# Patient Record
Sex: FEMALE | Race: WHITE | URBAN - METROPOLITAN AREA
[De-identification: names, ages, dates, MRNs, and addresses within clinical notes are randomized per-mention and may not be internally consistent; named-entity substitution may affect disease eponyms.]

---

## 2017-07-17 ENCOUNTER — APPOINTMENT (OUTPATIENT)
Dept: ULTRASOUND IMAGING | Facility: CLINIC | Age: 22
End: 2017-07-17
Attending: INTERNAL MEDICINE
Payer: COMMERCIAL

## 2017-07-17 ENCOUNTER — HOSPITAL ENCOUNTER (OUTPATIENT)
Facility: CLINIC | Age: 22
Setting detail: OBSERVATION
Discharge: HOME | End: 2017-07-18
Attending: EMERGENCY MEDICINE | Admitting: EMERGENCY MEDICINE
Payer: COMMERCIAL

## 2017-07-17 DIAGNOSIS — N10 ACUTE PYELONEPHRITIS: ICD-10-CM

## 2017-07-17 DIAGNOSIS — N12 PYELONEPHRITIS: Primary | ICD-10-CM

## 2017-07-17 DIAGNOSIS — R11.0 NAUSEA: ICD-10-CM

## 2017-07-17 LAB
ALBUMIN SERPL-MCNC: 3.9 G/DL (ref 3.4–5)
ALBUMIN UR-MCNC: 100 MG/DL
ALP SERPL-CCNC: 82 U/L (ref 40–150)
ALT SERPL W P-5'-P-CCNC: 34 U/L (ref 0–50)
ANION GAP SERPL CALCULATED.3IONS-SCNC: 9 MMOL/L (ref 3–14)
APPEARANCE UR: ABNORMAL
AST SERPL W P-5'-P-CCNC: 18 U/L (ref 0–45)
BASOPHILS # BLD AUTO: 0 10E9/L (ref 0–0.2)
BASOPHILS NFR BLD AUTO: 0.2 %
BILIRUB SERPL-MCNC: 0.8 MG/DL (ref 0.2–1.3)
BILIRUB UR QL STRIP: NEGATIVE
BUN SERPL-MCNC: 8 MG/DL (ref 7–30)
CALCIUM SERPL-MCNC: 9.3 MG/DL (ref 8.5–10.1)
CHLORIDE SERPL-SCNC: 104 MMOL/L (ref 94–109)
CO2 SERPL-SCNC: 27 MMOL/L (ref 20–32)
COLOR UR AUTO: YELLOW
CREAT SERPL-MCNC: 0.72 MG/DL (ref 0.52–1.04)
DIFFERENTIAL METHOD BLD: ABNORMAL
EOSINOPHIL # BLD AUTO: 0 10E9/L (ref 0–0.7)
EOSINOPHIL NFR BLD AUTO: 0.2 %
ERYTHROCYTE [DISTWIDTH] IN BLOOD BY AUTOMATED COUNT: 12.5 % (ref 10–15)
GFR SERPL CREATININE-BSD FRML MDRD: ABNORMAL ML/MIN/1.7M2
GLUCOSE SERPL-MCNC: 110 MG/DL (ref 70–99)
GLUCOSE UR STRIP-MCNC: NEGATIVE MG/DL
HCG UR QL: NEGATIVE
HCT VFR BLD AUTO: 42.7 % (ref 35–47)
HGB BLD-MCNC: 14.5 G/DL (ref 11.7–15.7)
HGB UR QL STRIP: ABNORMAL
IMM GRANULOCYTES # BLD: 0 10E9/L (ref 0–0.4)
IMM GRANULOCYTES NFR BLD: 0.2 %
INTERNAL QC OK POCT: YES
KETONES UR STRIP-MCNC: 10 MG/DL
LEUKOCYTE ESTERASE UR QL STRIP: ABNORMAL
LYMPHOCYTES # BLD AUTO: 1.1 10E9/L (ref 0.8–5.3)
LYMPHOCYTES NFR BLD AUTO: 8.9 %
MCH RBC QN AUTO: 30.3 PG (ref 26.5–33)
MCHC RBC AUTO-ENTMCNC: 34 G/DL (ref 31.5–36.5)
MCV RBC AUTO: 89 FL (ref 78–100)
MONOCYTES # BLD AUTO: 0.6 10E9/L (ref 0–1.3)
MONOCYTES NFR BLD AUTO: 5 %
MUCOUS THREADS #/AREA URNS LPF: PRESENT /LPF
NEUTROPHILS # BLD AUTO: 10.6 10E9/L (ref 1.6–8.3)
NEUTROPHILS NFR BLD AUTO: 85.5 %
NITRATE UR QL: NEGATIVE
NRBC # BLD AUTO: 0 10*3/UL
NRBC BLD AUTO-RTO: 0 /100
PH UR STRIP: 7 PH (ref 5–7)
PLATELET # BLD AUTO: 225 10E9/L (ref 150–450)
POTASSIUM SERPL-SCNC: 3.4 MMOL/L (ref 3.4–5.3)
PROT SERPL-MCNC: 8 G/DL (ref 6.8–8.8)
RBC # BLD AUTO: 4.78 10E12/L (ref 3.8–5.2)
RBC #/AREA URNS AUTO: 25 /HPF (ref 0–2)
SODIUM SERPL-SCNC: 139 MMOL/L (ref 133–144)
SP GR UR STRIP: 1.01 (ref 1–1.03)
SQUAMOUS #/AREA URNS AUTO: 4 /HPF (ref 0–1)
TRANS CELLS #/AREA URNS HPF: 3 /HPF (ref 0–1)
URN SPEC COLLECT METH UR: ABNORMAL
UROBILINOGEN UR STRIP-MCNC: NORMAL MG/DL (ref 0–2)
WBC # BLD AUTO: 12.4 10E9/L (ref 4–11)
WBC #/AREA URNS AUTO: >182 /HPF (ref 0–2)
WBC CLUMPS #/AREA URNS HPF: PRESENT /HPF

## 2017-07-17 PROCEDURE — 76770 US EXAM ABDO BACK WALL COMP: CPT

## 2017-07-17 PROCEDURE — 96361 HYDRATE IV INFUSION ADD-ON: CPT | Performed by: EMERGENCY MEDICINE

## 2017-07-17 PROCEDURE — 96375 TX/PRO/DX INJ NEW DRUG ADDON: CPT | Performed by: EMERGENCY MEDICINE

## 2017-07-17 PROCEDURE — 99285 EMERGENCY DEPT VISIT HI MDM: CPT | Mod: 25 | Performed by: EMERGENCY MEDICINE

## 2017-07-17 PROCEDURE — 25000128 H RX IP 250 OP 636: Performed by: NURSE PRACTITIONER

## 2017-07-17 PROCEDURE — 96376 TX/PRO/DX INJ SAME DRUG ADON: CPT

## 2017-07-17 PROCEDURE — 96365 THER/PROPH/DIAG IV INF INIT: CPT | Performed by: EMERGENCY MEDICINE

## 2017-07-17 PROCEDURE — 99207 ZZC APP CREDIT; MD BILLING SHARED VISIT: CPT | Mod: Z6 | Performed by: EMERGENCY MEDICINE

## 2017-07-17 PROCEDURE — 81001 URINALYSIS AUTO W/SCOPE: CPT | Performed by: EMERGENCY MEDICINE

## 2017-07-17 PROCEDURE — 76705 ECHO EXAM OF ABDOMEN: CPT

## 2017-07-17 PROCEDURE — 87086 URINE CULTURE/COLONY COUNT: CPT | Performed by: EMERGENCY MEDICINE

## 2017-07-17 PROCEDURE — G0378 HOSPITAL OBSERVATION PER HR: HCPCS

## 2017-07-17 PROCEDURE — 85025 COMPLETE CBC W/AUTO DIFF WBC: CPT | Performed by: EMERGENCY MEDICINE

## 2017-07-17 PROCEDURE — 81025 URINE PREGNANCY TEST: CPT | Performed by: EMERGENCY MEDICINE

## 2017-07-17 PROCEDURE — 99220 ZZC INITIAL OBSERVATION CARE,LEVL III: CPT | Mod: Z6 | Performed by: NURSE PRACTITIONER

## 2017-07-17 PROCEDURE — 96366 THER/PROPH/DIAG IV INF ADDON: CPT | Performed by: EMERGENCY MEDICINE

## 2017-07-17 PROCEDURE — 80053 COMPREHEN METABOLIC PANEL: CPT | Performed by: EMERGENCY MEDICINE

## 2017-07-17 PROCEDURE — 25000128 H RX IP 250 OP 636: Performed by: EMERGENCY MEDICINE

## 2017-07-17 PROCEDURE — 87088 URINE BACTERIA CULTURE: CPT | Performed by: EMERGENCY MEDICINE

## 2017-07-17 PROCEDURE — 87186 SC STD MICRODIL/AGAR DIL: CPT | Performed by: EMERGENCY MEDICINE

## 2017-07-17 RX ORDER — ONDANSETRON 2 MG/ML
4 INJECTION INTRAMUSCULAR; INTRAVENOUS EVERY 6 HOURS PRN
Status: DISCONTINUED | OUTPATIENT
Start: 2017-07-17 | End: 2017-07-17

## 2017-07-17 RX ORDER — NALOXONE HYDROCHLORIDE 0.4 MG/ML
.1-.4 INJECTION, SOLUTION INTRAMUSCULAR; INTRAVENOUS; SUBCUTANEOUS
Status: DISCONTINUED | OUTPATIENT
Start: 2017-07-17 | End: 2017-07-17

## 2017-07-17 RX ORDER — LEVOFLOXACIN 5 MG/ML
750 INJECTION, SOLUTION INTRAVENOUS EVERY 24 HOURS
Status: DISCONTINUED | OUTPATIENT
Start: 2017-07-18 | End: 2017-07-18

## 2017-07-17 RX ORDER — LIDOCAINE 40 MG/G
CREAM TOPICAL
Status: DISCONTINUED | OUTPATIENT
Start: 2017-07-17 | End: 2017-07-17

## 2017-07-17 RX ORDER — DESOGESTREL AND ETHINYL ESTRADIOL 0.15-0.03
1 KIT ORAL DAILY
Status: DISCONTINUED | OUTPATIENT
Start: 2017-07-18 | End: 2017-07-18 | Stop reason: HOSPADM

## 2017-07-17 RX ORDER — PROCHLORPERAZINE 25 MG
25 SUPPOSITORY, RECTAL RECTAL EVERY 12 HOURS PRN
Status: DISCONTINUED | OUTPATIENT
Start: 2017-07-17 | End: 2017-07-18 | Stop reason: HOSPADM

## 2017-07-17 RX ORDER — DESOGESTREL AND ETHINYL ESTRADIOL 0.15-0.03
1 KIT ORAL DAILY
COMMUNITY
End: 2018-10-25

## 2017-07-17 RX ORDER — PROCHLORPERAZINE MALEATE 5 MG
5-10 TABLET ORAL EVERY 6 HOURS PRN
Status: DISCONTINUED | OUTPATIENT
Start: 2017-07-17 | End: 2017-07-17

## 2017-07-17 RX ORDER — ONDANSETRON 4 MG/1
4 TABLET, ORALLY DISINTEGRATING ORAL EVERY 6 HOURS PRN
Status: DISCONTINUED | OUTPATIENT
Start: 2017-07-17 | End: 2017-07-18 | Stop reason: HOSPADM

## 2017-07-17 RX ORDER — PROCHLORPERAZINE MALEATE 5 MG
5-10 TABLET ORAL EVERY 6 HOURS PRN
Status: DISCONTINUED | OUTPATIENT
Start: 2017-07-17 | End: 2017-07-18 | Stop reason: HOSPADM

## 2017-07-17 RX ORDER — HYDROMORPHONE HYDROCHLORIDE 1 MG/ML
.3-.5 INJECTION, SOLUTION INTRAMUSCULAR; INTRAVENOUS; SUBCUTANEOUS EVERY 4 HOURS PRN
Status: DISCONTINUED | OUTPATIENT
Start: 2017-07-17 | End: 2017-07-18 | Stop reason: HOSPADM

## 2017-07-17 RX ORDER — PROCHLORPERAZINE 25 MG
25 SUPPOSITORY, RECTAL RECTAL EVERY 12 HOURS PRN
Status: DISCONTINUED | OUTPATIENT
Start: 2017-07-17 | End: 2017-07-17

## 2017-07-17 RX ORDER — NALOXONE HYDROCHLORIDE 0.4 MG/ML
.1-.4 INJECTION, SOLUTION INTRAMUSCULAR; INTRAVENOUS; SUBCUTANEOUS
Status: DISCONTINUED | OUTPATIENT
Start: 2017-07-17 | End: 2017-07-18 | Stop reason: HOSPADM

## 2017-07-17 RX ORDER — LEVOFLOXACIN 5 MG/ML
750 INJECTION, SOLUTION INTRAVENOUS ONCE
Status: COMPLETED | OUTPATIENT
Start: 2017-07-17 | End: 2017-07-17

## 2017-07-17 RX ORDER — MORPHINE SULFATE 4 MG/ML
4 INJECTION, SOLUTION INTRAMUSCULAR; INTRAVENOUS
Status: COMPLETED | OUTPATIENT
Start: 2017-07-17 | End: 2017-07-17

## 2017-07-17 RX ORDER — ONDANSETRON 2 MG/ML
4 INJECTION INTRAMUSCULAR; INTRAVENOUS ONCE
Status: COMPLETED | OUTPATIENT
Start: 2017-07-17 | End: 2017-07-17

## 2017-07-17 RX ORDER — KETOROLAC TROMETHAMINE 30 MG/ML
30 INJECTION, SOLUTION INTRAMUSCULAR; INTRAVENOUS ONCE
Status: COMPLETED | OUTPATIENT
Start: 2017-07-17 | End: 2017-07-17

## 2017-07-17 RX ORDER — SODIUM CHLORIDE 9 MG/ML
INJECTION, SOLUTION INTRAVENOUS CONTINUOUS
Status: DISCONTINUED | OUTPATIENT
Start: 2017-07-17 | End: 2017-07-18 | Stop reason: HOSPADM

## 2017-07-17 RX ORDER — ONDANSETRON 4 MG/1
4 TABLET, ORALLY DISINTEGRATING ORAL EVERY 6 HOURS PRN
Status: DISCONTINUED | OUTPATIENT
Start: 2017-07-17 | End: 2017-07-17

## 2017-07-17 RX ORDER — ONDANSETRON 2 MG/ML
4 INJECTION INTRAMUSCULAR; INTRAVENOUS EVERY 6 HOURS PRN
Status: DISCONTINUED | OUTPATIENT
Start: 2017-07-17 | End: 2017-07-18 | Stop reason: HOSPADM

## 2017-07-17 RX ORDER — KETOROLAC TROMETHAMINE 30 MG/ML
15-30 INJECTION, SOLUTION INTRAMUSCULAR; INTRAVENOUS EVERY 6 HOURS PRN
Status: DISCONTINUED | OUTPATIENT
Start: 2017-07-17 | End: 2017-07-18 | Stop reason: HOSPADM

## 2017-07-17 RX ORDER — ACETAMINOPHEN 325 MG/1
650 TABLET ORAL EVERY 4 HOURS PRN
Status: DISCONTINUED | OUTPATIENT
Start: 2017-07-17 | End: 2017-07-18 | Stop reason: HOSPADM

## 2017-07-17 RX ORDER — HYDROCODONE BITARTRATE AND ACETAMINOPHEN 5; 325 MG/1; MG/1
1-2 TABLET ORAL EVERY 4 HOURS PRN
Status: DISCONTINUED | OUTPATIENT
Start: 2017-07-17 | End: 2017-07-18 | Stop reason: HOSPADM

## 2017-07-17 RX ORDER — LIDOCAINE 40 MG/G
CREAM TOPICAL
Status: DISCONTINUED | OUTPATIENT
Start: 2017-07-17 | End: 2017-07-18 | Stop reason: HOSPADM

## 2017-07-17 RX ADMIN — MORPHINE SULFATE 4 MG: 4 INJECTION, SOLUTION INTRAMUSCULAR; INTRAVENOUS at 14:30

## 2017-07-17 RX ADMIN — SODIUM CHLORIDE: 9 INJECTION, SOLUTION INTRAVENOUS at 20:55

## 2017-07-17 RX ADMIN — LEVOFLOXACIN 750 MG: 5 INJECTION, SOLUTION INTRAVENOUS at 16:06

## 2017-07-17 RX ADMIN — KETOROLAC TROMETHAMINE 30 MG: 30 INJECTION, SOLUTION INTRAMUSCULAR at 20:25

## 2017-07-17 RX ADMIN — KETOROLAC TROMETHAMINE 30 MG: 30 INJECTION, SOLUTION INTRAMUSCULAR; INTRAVENOUS at 14:23

## 2017-07-17 RX ADMIN — SODIUM CHLORIDE 1000 ML: 9 INJECTION, SOLUTION INTRAVENOUS at 14:23

## 2017-07-17 RX ADMIN — ONDANSETRON 4 MG: 2 INJECTION INTRAMUSCULAR; INTRAVENOUS at 14:23

## 2017-07-17 ASSESSMENT — ACTIVITIES OF DAILY LIVING (ADL)
RETIRED_COMMUNICATION: 0-->UNDERSTANDS/COMMUNICATES WITHOUT DIFFICULTY
FALL_HISTORY_WITHIN_LAST_SIX_MONTHS: NO
DRESS: 0-->INDEPENDENT
RETIRED_EATING: 0-->INDEPENDENT
COGNITION: 0 - NO COGNITION ISSUES REPORTED
AMBULATION: 0-->INDEPENDENT
BATHING: 0-->INDEPENDENT
SWALLOWING: 0-->SWALLOWS FOODS/LIQUIDS WITHOUT DIFFICULTY
TOILETING: 0-->INDEPENDENT
TRANSFERRING: 0-->INDEPENDENT

## 2017-07-17 ASSESSMENT — ENCOUNTER SYMPTOMS
CONSTIPATION: 0
ABDOMINAL PAIN: 1
HEMATURIA: 0
NAUSEA: 1
APPETITE CHANGE: 0
VOMITING: 1
FEVER: 0
DIFFICULTY URINATING: 1
DIAPHORESIS: 1
BACK PAIN: 1
DIARRHEA: 0
FREQUENCY: 1
DYSURIA: 0

## 2017-07-17 NOTE — IP AVS SNAPSHOT
Unit [address] Observation [address] [address]    Phone:  [phone]    Fax:  [phone]                                       After Visit Summary   7/17/2017    Arabella Vásquez    MRN: [id number]           After Visit Summary Signature Page     I have received my discharge instructions, and my questions have been answered. I have discussed any challenges I see with this plan with the nurse or doctor.    ..........................................................................................................................................  Patient/Patient Representative Signature      ..........................................................................................................................................  Patient Representative Print Name and Relationship to Patient    ..................................................               ................................................  Date                                            Time    ..........................................................................................................................................  Reviewed by Signature/Title    ...................................................              ..............................................  Date                                                            Time

## 2017-07-17 NOTE — PHARMACY-ADMISSION MEDICATION HISTORY
Admission medication history interview status for the 7/17/2017 admission is complete. See Epic admission navigator for allergy information, pharmacy, prior to admission medications and immunization status.     Medication history interview sources:  Patient     Changes made to PTA medication list (reason)  Added: None   Deleted: None   Changed: None    Additional medication history information (including reliability of information, actions taken by pharmacist):  -Patient alert and knowledgeable about medications   -Verified medication allergies and reactions   -Influenza (per [facility]): 11/2/16  -Patient currently does not have her birth control with her but her roommate will be bringing it in today.       Prior to Admission medications    Medication Sig Last Dose Taking? Auth Provider   desogestrel-ethinyl estradiol (APRI) 0.15-30 MG-MCG per tablet Take 1 tablet by mouth daily 7/16/2017 at 1300 Yes Reported, Patient         Medication history completed by: Lubna Mcdaniels, Pharm D Student

## 2017-07-17 NOTE — IP AVS SNAPSHOT
MRN:[id number]                      After Visit Summary   7/17/2017    Arabella Vásquez    MRN: [id number]           Thank you!     Thank you for choosing [facility] for your care. Our goal is always to provide you with excellent care. Hearing back from our patients is one way we can continue to improve our services. Please take a few minutes to complete the written survey that you may receive in the mail after you visit with us. Thank you!        Patient Information     Date Of Birth          1995        Designated Caregiver       Most Recent Value    Caregiver    Will someone help with your care after discharge? no      About your hospital stay     You were admitted on:  July 17, 2017 You last received care in the:  Unit 6D Observation [facility]    You were discharged on:  July 18, 2017        Reason for your hospital stay       You were hospitalized due to a bladder and kidney infection. This has improved with IV antibiotics. Please continue oral antibiotics to complete a 14 day total course.                  Who to Call     For medical emergencies, please call 911.  For non-urgent questions about your medical care, please call your primary care provider or clinic, None          Attending Provider     Provider Specialty    Chelsea De La O MD Emergency Medicine    Juan, Rosalio Cullen MD Emergency Medicine    [facility], Dee Mahajan MD Emergency Medicine       Primary Care Provider    Clay Steel       When to contact your care team       Call your primary doctor if you have any of the following: temperature greater than 100.4F, increased nausea or increased abdominal pain.                  After Care Instructions     Activity       Your activity upon discharge: activity as tolerated, please limit strenuous activity, seek medical care if any new muscle or tendon pain.            Diet       Follow this diet upon discharge: Regular Diet                  Follow-up Appointments      "Adult [facility] Follow-up and recommended labs and tests       Follow up with primary care provider, RYNE MERRILL, within 7 days for hospital follow-up and to review urine culture results.     Appointments on [place] and/or [facility] (with [facility] or [facility] provider or service). Call [phone] if you haven't heard regarding these appointments within 7 days of discharge.                  Pending Results     Date and Time Order Name Status Description    2017 1400 Urine Culture Aerobic Bacterial Preliminary             Statement of Approval     Ordered          17 1610  I have reviewed and agree with all the recommendations and orders detailed in this document.  EFFECTIVE NOW     Approved and electronically signed by:  Daniela Bernardo PA-C             Admission Information     Date & Time Provider Department Dept. Phone    2017 Dee Mcneil MD Unit 6D Observation [facility] [place] [phone]      Your Vitals Were     Blood Pressure Pulse Temperature Respirations Height Weight    114/56 (BP Location: Left arm) 92 98.4  F (36.9  C) 16 1.702 m (5' 7\") 65 kg (143 lb 3.2 oz)    Last Period Pulse Oximetry BMI (Body Mass Index)             2017 (Exact Date) 98% 22.43 kg/m2         MyChart Information     [employer] lets you send messages to your doctor, view your test results, renew your prescriptions, schedule appointments and more. To sign up, go to [email or web address] . Click on \"Log in\" on the left side of the screen, which will take you to the Welcome page. Then click on \"Sign up Now\" on the right side of the page.     You will be asked to enter the access code listed below, as well as some personal information. Please follow the directions to create your username and password.     Your access code is: [email or web address]  Expires: 10/16/2017  4:20 PM     Your access code will  in 90 days. If you need help or a new code, please call your [facility] clinic or [phone].      "   Care EveryWhere ID     This is your Care EveryWhere ID. This could be used by other organizations to access your [facility] medical records  [id number]        Equal Access to Services     MONAE ALBERTO : German Garcia, joslyn nicholeyessiha, domenica alvarado, galo griffinin hayaan nikitricia mcclure la'jennsummer slick [facility] [phone].    ATENCIÓN: Si habla español, tiene a kendall disposición servicios gratuitos de asistencia lingüística. Llame al [phone].    We comply with applicable federal civil rights laws and [place] laws. We do not discriminate on the basis of race, color, national origin, age, disability sex, sexual orientation or gender identity.               Review of your medicines      START taking        Dose / Directions    levofloxacin 250 MG tablet   Commonly known as:  LEVAQUIN   Used for:  Acute pyelonephritis        Dose:  250 mg   Take 1 tablet (250 mg) by mouth daily   Quantity:  12 tablet   Refills:  0       ondansetron 4 MG ODT tab   Commonly known as:  ZOFRAN-ODT   Used for:  Nausea        Dose:  4 mg   Take 1 tablet (4 mg) by mouth every 6 hours as needed for nausea or vomiting   Quantity:  20 tablet   Refills:  0         CONTINUE these medicines which have NOT CHANGED        Dose / Directions    desogestrel-ethinyl estradiol 0.15-30 MG-MCG per tablet   Commonly known as:  APRI   Indication:  Birth Control        Dose:  1 tablet   Take 1 tablet by mouth daily   Refills:  0            Where to get your medicines      These medications were sent to [place], [place] - [address] [place] [address], [facility] [address]     Phone:  [phone]     levofloxacin 250 MG tablet    ondansetron 4 MG ODT tab                Protect others around you: Learn how to safely use, store and throw away your medicines at www.disposemymeds.org.             Medication List: This is a list of all your medications and when to take them. Check marks below indicate your  daily home schedule. Keep this list as a reference.      Medications           Morning Afternoon Evening Bedtime As Needed    desogestrel-ethinyl estradiol 0.15-30 MG-MCG per tablet   Commonly known as:  APRI   Take 1 tablet by mouth daily                                levofloxacin 250 MG tablet   Commonly known as:  LEVAQUIN   Take 1 tablet (250 mg) by mouth daily                                ondansetron 4 MG ODT tab   Commonly known as:  ZOFRAN-ODT   Take 1 tablet (4 mg) by mouth every 6 hours as needed for nausea or vomiting

## 2017-07-17 NOTE — ED PROVIDER NOTES
History     Chief Complaint   Patient presents with     Abdominal Pain     HPI  Arabella Vásquez is a 22 year old female who presents for evaluation of abdominal pain and back pain. Patient complains of right lower quadrant abdominal pain that has transitioned to her right lower back since its onset around 6 PM yesterday evening. She reports the pain has been constant since onset and would intermittently wake her from sleep. She has had four episodes of non-bloody vomiting since her symptoms began with associated diaphoresis. She does currently complain of nausea in addition to her pain.  She did have a UTI two weeks ago at which time she was prescribed a five day course of Macrobid which she completed. She is still urinating more frequently compared to baseline, and does feel as though she is not fully emptying her bladder after urinating, however, denies dysuria or hematuria. She denies diarrhea. She has had life-long issues with constipation, but has been able to pass bowel movements normally. Her last menstrual period was last Wednesday. She denies any chance of pregnancy. She does get vaginal discharge at baseline, and denies any changes from baseline.  Her last PO intake was a banana around 10:30 AM this morning. No history of ovarian cysts. No surgical history.     I have reviewed the Medications, Allergies, Past Medical and Surgical History, and Social History in the Epic system.  No past medical history on file.    No past surgical history on file.    No family history on file.    Social History   Substance Use Topics     Smoking status: Not on file     Smokeless tobacco: Not on file     Alcohol use Not on file       Current Facility-Administered Medications   Medication     levofloxacin (LEVAQUIN) infusion 750 mg     Current Outpatient Prescriptions   Medication     desogestrel-ethinyl estradiol (APRI) 0.15-30 MG-MCG per tablet        Allergies   Allergen Reactions     Amoxicillin Hives     Bactrim  "[Sulfamethoxazole W/Trimethoprim] Nausea and Vomiting     Cefzil [Cefprozil] Hives     Clindamycin Nausea and Vomiting       Review of Systems   Constitutional: Positive for diaphoresis. Negative for appetite change and fever.   Gastrointestinal: Positive for abdominal pain (RLQ), nausea and vomiting (x4 episodes). Negative for constipation and diarrhea.   Genitourinary: Positive for difficulty urinating and frequency (increased). Negative for dysuria, hematuria, vaginal bleeding and vaginal discharge.   Musculoskeletal: Positive for back pain (right lower).       Physical Exam   BP: 115/74  Pulse: 93  Temp: 99  F (37.2  C)  Resp: 16  Height: 170.2 cm (5' 7\")  Weight: 65.8 kg (145 lb)  SpO2: 98 %  Physical Exam   Constitutional: No distress.   Adult female, alert, cooperative, moves somewhat slowly and appears uncomfortable   HENT:   Head: Normocephalic and atraumatic.   Mouth/Throat: Oropharynx is clear and moist. No oropharyngeal exudate.   Eyes: Pupils are equal, round, and reactive to light. No scleral icterus.   Cardiovascular: Normal rate, regular rhythm, normal heart sounds and intact distal pulses.    No murmur heard.  Pulmonary/Chest: Effort normal and breath sounds normal. No respiratory distress. She has no wheezes. She has no rales.   Abdominal: Soft. Bowel sounds are normal. There is no tenderness.   Abdomen is soft, flat. Somewhat diffusely TTP on the right side with RUQ and RLQ TTP without focal guarding or rebound. No L sided TTP. Mild suprapubic TTP.   Genitourinary:   Genitourinary Comments: R CVA TTP   Musculoskeletal: She exhibits no edema or tenderness.   Skin: Skin is warm. No rash noted. She is not diaphoretic.   Nursing note and vitals reviewed.      ED Course     ED Course     Procedures       1:58 PM  The patient was seen and examined by Dr. De La O in Room 12.          Critical Care time:  none               Results for orders placed or performed during the hospital encounter of 07/17/17 " (from the past 24 hour(s))   Comprehensive metabolic panel   Result Value Ref Range    Sodium 139 133 - 144 mmol/L    Potassium 3.4 3.4 - 5.3 mmol/L    Chloride 104 94 - 109 mmol/L    Carbon Dioxide 27 20 - 32 mmol/L    Anion Gap 9 3 - 14 mmol/L    Glucose 110 (H) 70 - 99 mg/dL    Urea Nitrogen 8 7 - 30 mg/dL    Creatinine 0.72 0.52 - 1.04 mg/dL    GFR Estimate >90  Non  GFR Calc   >60 mL/min/1.7m2    GFR Estimate If Black >90   GFR Calc   >60 mL/min/1.7m2    Calcium 9.3 8.5 - 10.1 mg/dL    Bilirubin Total 0.8 0.2 - 1.3 mg/dL    Albumin 3.9 3.4 - 5.0 g/dL    Protein Total 8.0 6.8 - 8.8 g/dL    Alkaline Phosphatase 82 40 - 150 U/L    ALT 34 0 - 50 U/L    AST 18 0 - 45 U/L   CBC with platelets differential   Result Value Ref Range    WBC 12.4 (H) 4.0 - 11.0 10e9/L    RBC Count 4.78 3.8 - 5.2 10e12/L    Hemoglobin 14.5 11.7 - 15.7 g/dL    Hematocrit 42.7 35.0 - 47.0 %    MCV 89 78 - 100 fl    MCH 30.3 26.5 - 33.0 pg    MCHC 34.0 31.5 - 36.5 g/dL    RDW 12.5 10.0 - 15.0 %    Platelet Count 225 150 - 450 10e9/L    Diff Method Automated Method     % Neutrophils 85.5 %    % Lymphocytes 8.9 %    % Monocytes 5.0 %    % Eosinophils 0.2 %    % Basophils 0.2 %    % Immature Granulocytes 0.2 %    Nucleated RBCs 0 0 /100    Absolute Neutrophil 10.6 (H) 1.6 - 8.3 10e9/L    Absolute Lymphocytes 1.1 0.8 - 5.3 10e9/L    Absolute Monocytes 0.6 0.0 - 1.3 10e9/L    Absolute Eosinophils 0.0 0.0 - 0.7 10e9/L    Absolute Basophils 0.0 0.0 - 0.2 10e9/L    Abs Immature Granulocytes 0.0 0 - 0.4 10e9/L    Absolute Nucleated RBC 0.0    UA reflex to Microscopic and Culture   Result Value Ref Range    Color Urine Yellow     Appearance Urine Cloudy     Glucose Urine Negative NEG mg/dL    Bilirubin Urine Negative NEG    Ketones Urine 10 (A) NEG mg/dL    Specific Gravity Urine 1.012 1.003 - 1.035    Blood Urine Moderate (A) NEG    pH Urine 7.0 5.0 - 7.0 pH    Protein Albumin Urine 100 (A) NEG mg/dL    Urobilinogen  mg/dL Normal 0.0 - 2.0 mg/dL    Nitrite Urine Negative NEG    Leukocyte Esterase Urine Large (A) NEG    Source Midstream Urine     RBC Urine 25 (H) 0 - 2 /HPF    WBC Urine >182 (H) 0 - 2 /HPF    WBC Clumps Present (A) NEG /HPF    Squamous Epithelial /HPF Urine 4 (H) 0 - 1 /HPF    Transitional Epi 3 (H) 0 - 1 /HPF    Mucous Urine Present (A) NEG /LPF   hCG qual urine POCT   Result Value Ref Range    HCG Qual Urine Negative neg    Internal QC OK Yes               Assessments & Plan (with Medical Decision Making)   This is a 22 year old female who presents to the Emergency Department today with 18 hours of right flank pain that is now approaching the right lower quadrant region. She has had some nausea and vomiting with this and is concerned about these symptoms. Differential diagnosis certainly could include pyelonephritis, patient was recently treated for a UTI with what sounds like Macrobid. She completed a five day course of treatment. Her clinic visit and urinalysis from that infection is from [place] which we do not have access to. Other differentials could include renal colic which I think is much less likely. Also need to consider the possibility of appendicitis. On exam she has a temperature of 99 degrees, she is not tachycardic. She does have some right CVA tenderness to percussion. She also has some somewhat diffuse right sided tenderness to palpation, more so in the right lower quadrant.     We did establish IV access and we did draw blood for laboratory analysis. CBC shows a slight leukocytosis with a white count of 12.4 with a left shift. CMP is essentially normal. UA demonstrates moderate blood with large LE, 25 red cells and greater than 182 white cells with white blood cell clumps. This has been cultured. UPT was negative. Patient was given IV fluids here in the Emergency Department as well as a dose of Toradol for pain and Zofran for nausea. Morphine was also available for pain if she needed this. She  was reassessed after labs resulted.     After IV fluids, Zofran, Toradol, and one dose of morphine she is feeling much better.     I do not have access to the urine sample that she gave previously where she was treated for a UTI. The patient could possibly have treatment failure due to inappropriate antibiotic choice or it is possible that she may have developed resistance. It is not possible for me to say at this point. However, I do think it may be reasonable to admit her to the observation unit overnight for monitoring of her clinical condition and treatment with IV antibiotics. She has multiple antibiotic allergies which does complicate her treatment. She gets hives from penicillins as well as Cefzil/cephalosporins. She has nausea and vomiting from Bactrim as well as clindamycin. At this point I think it may be reasonable to treat her with Levaquin. She is really doing quite well, if she is doing well tomorrow and her symptoms are well controlled I think it may be reasonable to discharge her with a course of Levaquin to complete treatment for acute pyelonephritis. If she is not getting any better then I would recommend placing her on inpatient status and doing further work up, possibly to include imaging. At this point however, I think that her story fits more with pyelonephritis rather than appendicitis or any other acute intraabdominal process. Patient is agreeable to this. I have spoken to the HALIE on the observation unit and she is also in agreement with this plan.       I have reviewed the nursing notes.    I have reviewed the findings, diagnosis, plan and need for follow up with the patient.    New Prescriptions    No medications on file       Final diagnoses:   Acute pyelonephritis   I, Cara Multani, am serving as a trained medical scribe to document services personally performed by Chelsea De La O MD, based on the provider's statements to me.   I, Chelsea De La O MD, was physically present and have  reviewed and verified the accuracy of this note documented by Cara Multani.      7/17/2017   [facility], [place], EMERGENCY DEPARTMENT     Chelsea De La O MD  07/17/17 [id number]

## 2017-07-17 NOTE — ED NOTES
Fahad comes in for about 18 hours of worsening abdominal pain that has now migrated to her right flank and hip area. She also has had N/V and multiple BM's in the past 18 hours. Mom is a nurse and is concerned for appy.

## 2017-07-17 NOTE — PROGRESS NOTES
"Patient interviewed and examined.Labs, imaging and chart notes reviewed.   Arabella Vásquez is a generally healthy [employer] student who presented to the ED this afternoon with 1 day of nausea, vomiting, right flank pain and RLQ abdominal pain. She was in her usual state of good health until about 6 PM yesterday when she developed severe and rapidly worsening right flank pain. The pain was associated with nausea and vomiting. The pain woke her during the night. She initially thought she was constipated, but the pain worsened after a bowel movement. The pain started in the right flank, then has radiated to the RLQ and suprapubic region. 10 days ago she was treated for a UTI (her first ever) at [facility] with Macrobid for 5 days. She had dysuria and suprapubic pain with that, but no fever or flank pain. The symptoms improved after a day of antibiotics, but she has continued to have some urinary frequency and mild urgency. She had sweats with vomiting, but otherwise has noted no fever or chills. She has no URI symptoms, cough, sputum, shortness of breath, diarrhea, leg pain or swelling. Menses have been normal. She denies history of UTI or kidney stones. She has multiple antibiotic allergies including amoxacillin, Cefzil, and bactrim. She had hives with cefzil and amoxacillin. She has GI upset with bactrim and clindamycin.    PAST MEDICAL HISTORY: No past medical history on file.    PAST SURGICAL HISTORY: No past surgical history on file.    FAMILY HISTORY: No family history on file.    SOCIAL HISTORY:   Social History   Substance Use Topics     Smoking status: Not on file     Smokeless tobacco: Not on file     Alcohol use Not on file      ROS: 10 point ROS neg other than the symptoms noted above in the HPI.    Physical exam:  Young female in NAD.  /64  Pulse 93  Temp 99  F (37.2  C) (Oral)  Resp 16  Ht 1.702 m (5' 7\")  Wt 65.8 kg (145 lb)  LMP 07/12/2017 (Exact Date)  SpO2 94%  Breastfeeding? No  BMI 22.71 " kg/m2  HEENT: PERRLA. EOMI.  Neck: No mass or bruit.  Lungs: Clear.  Cardiac: RRR. S1/S2 no murmurs.  Abdomen: Right CVA tenderness. Tender in the right mid and lower quadrant. Mild suprapubic tenderness. No tenderness on the left.  Extrem: No edema. No calf tenderness.  Neuro: Alert, oriented. CN, motor, sensory intact.  Skin: No rash.  Psych: Normal mood and affect.    Labs/Imaging    Results for orders placed or performed during the hospital encounter of 07/17/17 (from the past 24 hour(s))   Comprehensive metabolic panel   Result Value Ref Range    Sodium 139 133 - 144 mmol/L    Potassium 3.4 3.4 - 5.3 mmol/L    Chloride 104 94 - 109 mmol/L    Carbon Dioxide 27 20 - 32 mmol/L    Anion Gap 9 3 - 14 mmol/L    Glucose 110 (H) 70 - 99 mg/dL    Urea Nitrogen 8 7 - 30 mg/dL    Creatinine 0.72 0.52 - 1.04 mg/dL    GFR Estimate >90  Non  GFR Calc   >60 mL/min/1.7m2    GFR Estimate If Black >90   GFR Calc   >60 mL/min/1.7m2    Calcium 9.3 8.5 - 10.1 mg/dL    Bilirubin Total 0.8 0.2 - 1.3 mg/dL    Albumin 3.9 3.4 - 5.0 g/dL    Protein Total 8.0 6.8 - 8.8 g/dL    Alkaline Phosphatase 82 40 - 150 U/L    ALT 34 0 - 50 U/L    AST 18 0 - 45 U/L   CBC with platelets differential   Result Value Ref Range    WBC 12.4 (H) 4.0 - 11.0 10e9/L    RBC Count 4.78 3.8 - 5.2 10e12/L    Hemoglobin 14.5 11.7 - 15.7 g/dL    Hematocrit 42.7 35.0 - 47.0 %    MCV 89 78 - 100 fl    MCH 30.3 26.5 - 33.0 pg    MCHC 34.0 31.5 - 36.5 g/dL    RDW 12.5 10.0 - 15.0 %    Platelet Count 225 150 - 450 10e9/L    Diff Method Automated Method     % Neutrophils 85.5 %    % Lymphocytes 8.9 %    % Monocytes 5.0 %    % Eosinophils 0.2 %    % Basophils 0.2 %    % Immature Granulocytes 0.2 %    Nucleated RBCs 0 0 /100    Absolute Neutrophil 10.6 (H) 1.6 - 8.3 10e9/L    Absolute Lymphocytes 1.1 0.8 - 5.3 10e9/L    Absolute Monocytes 0.6 0.0 - 1.3 10e9/L    Absolute Eosinophils 0.0 0.0 - 0.7 10e9/L    Absolute Basophils 0.0 0.0 - 0.2  10e9/L    Abs Immature Granulocytes 0.0 0 - 0.4 10e9/L    Absolute Nucleated RBC 0.0    UA reflex to Microscopic and Culture   Result Value Ref Range    Color Urine Yellow     Appearance Urine Cloudy     Glucose Urine Negative NEG mg/dL    Bilirubin Urine Negative NEG    Ketones Urine 10 (A) NEG mg/dL    Specific Gravity Urine 1.012 1.003 - 1.035    Blood Urine Moderate (A) NEG    pH Urine 7.0 5.0 - 7.0 pH    Protein Albumin Urine 100 (A) NEG mg/dL    Urobilinogen mg/dL Normal 0.0 - 2.0 mg/dL    Nitrite Urine Negative NEG    Leukocyte Esterase Urine Large (A) NEG    Source Midstream Urine     RBC Urine 25 (H) 0 - 2 /HPF    WBC Urine >182 (H) 0 - 2 /HPF    WBC Clumps Present (A) NEG /HPF    Squamous Epithelial /HPF Urine 4 (H) 0 - 1 /HPF    Transitional Epi 3 (H) 0 - 1 /HPF    Mucous Urine Present (A) NEG /LPF   hCG qual urine POCT   Result Value Ref Range    HCG Qual Urine Negative neg    Internal QC OK Yes      Impression:  Young female with recent UTI treated with Macrobid with partial symptomatic improvement. She presents with relatively abrupt onset of right flank and RLQ pain as well as nausea and vomiting. The labs and exam are most suggestive of acute pyelonephritis. She is unusually tender in the RLQ and had unusually abrupt onset of her symptoms. I would like to obtain an US of the Right kidney and RLQ tonight to exclude evidence of obstruction/stones or perinephric abscess and to look at the appendix. If the appendix is not visualized, would observe with repeat labs and serial abdominal exams. With a clear alternative cause for the pain (pyelonephritis), I would not expose her to radiation from a CT. Given her antibiotic allergies and probable true allergy to PCN ans cephalosporins, Levaquin or Cipro seem to be a reasonable choice for treatment pending culture results. Continue pain medications, antiemetics and IV fluids.    Rosalio Martinez MD

## 2017-07-17 NOTE — H&P
[facility]   History & Physical    Arabella Vásquez MRN# [id number]   Age: 22 year old YOB: 1995     Date of Admission: 7/17/2017    Primary Care Provider: Clay Steel         Chief Complaint:   Abdominal Pain         History of Present Illness Per ED MD:   Arabella Vásquez is a 22 year old female who presents for evaluation of abdominal pain and back pain. Patient complains of right lower quadrant abdominal pain that has transitioned to her right lower back since its onset around 6 PM yesterday evening. She reports the pain has been constant since onset and would intermittently wake her from sleep. She has had four episodes of non-bloody vomiting since her symptoms began with associated diaphoresis. She does currently complain of nausea in addition to her pain.  She did have a UTI two weeks ago at which time she was prescribed a five day course of Macrobid which she completed. She is still urinating more frequently compared to baseline, and does feel as though she is not fully emptying her bladder after urinating, however, denies dysuria or hematuria. She denies diarrhea. She has had life-long issues with constipation, but has been able to pass bowel movements normally. Her last menstrual period was last Wednesday. She denies any chance of pregnancy. She does get vaginal discharge at baseline, and denies any changes from baseline.  Her last PO intake was a banana around 10:30 AM this morning. No history of ovarian cysts. No surgical history.          Review of Systems:   A 10 point review of systems was performed and is negative unless otherwise noted in HPI. Unchanged from ER assessment.          Past Medical History:   No past medical history on file.          Past Surgical History:    No past surgical history on file.          Family History:   No family history on file.          Social History:     Social History   Substance Use Topics     Smoking status: Not on  "file     Smokeless tobacco: Not on file     Alcohol use Not on file             Medications:     Current Facility-Administered Medications:      lidocaine 1 % 1 mL, 1 mL, Other, Q1H PRN, Ann Aldridge APRN CNP     lidocaine (LMX4) kit, , Topical, Q1H PRN, Ann Aldridge APRN CNP     sodium chloride (PF) 0.9% PF flush 3 mL, 3 mL, Intracatheter, Q1H PRN, Ann Aldridge APRN CNP     sodium chloride (PF) 0.9% PF flush 3 mL, 3 mL, Intracatheter, Q8H, Ann Aldridge APRN CNP     naloxone (NARCAN) injection 0.1-0.4 mg, 0.1-0.4 mg, Intravenous, Q2 Min PRN, Ann Aldridge APRN CNP     ondansetron (ZOFRAN-ODT) ODT tab 4 mg, 4 mg, Oral, Q6H PRN **OR** ondansetron (ZOFRAN) injection 4 mg, 4 mg, Intravenous, Q6H PRN, Ann Aldridge APRN CNP     prochlorperazine (COMPAZINE) injection 5-10 mg, 5-10 mg, Intravenous, Q6H PRN **OR** prochlorperazine (COMPAZINE) tablet 5-10 mg, 5-10 mg, Oral, Q6H PRN **OR** prochlorperazine (COMPAZINE) Suppository 25 mg, 25 mg, Rectal, Q12H PRN, Ann Aldridge APRN CNP     0.9% sodium chloride infusion, , Intravenous, Continuous, Ann Aldridge APRN CNP     ketorolac (TORADOL) injection 15-30 mg, 15-30 mg, Intravenous, Q6H PRN, Ann Aldridge APRN CNP         Allergies:     Allergies   Allergen Reactions     Amoxicillin Hives     Bactrim [Sulfamethoxazole W/Trimethoprim] Nausea and Vomiting     Cefzil [Cefprozil] Hives     Clindamycin Nausea and Vomiting             Physical Exam:   /71  Pulse 74  Temp 99.1  F (37.3  C) (Oral)  Resp 16  Ht 1.702 m (5' 7\")  Wt 65 kg (143 lb 3.2 oz)  LMP 07/12/2017 (Exact Date)  SpO2 98%  Breastfeeding? No  BMI 22.43 kg/m2   GENERAL: Alert and oriented x 4. NAD. Pain currently rated at 5/10, better after morphine  HEENT: Anicteric sclera. PERRL. Mucous membranes moist and without lesions.   CV: RRR. S1, S2. No murmurs appreciated.   RESPIRATORY: Effort normal. Lungs CTAB with no wheezing, rales, rhonchi.   GI: Abdomen soft and non " distended with normoactive bowel sounds present in all quadrants. RLQ tenderness, no rebound or guarding.   MUSCULOSKELETAL: No joint swelling or tenderness. Moves all extremities.   NEUROLOGICAL: No focal deficits. Strength 5/5 bilaterally in upper and lower extremities.   EXTREMITIES: No peripheral edema. Intact bilateral pedal pulses.   SKIN: No jaundice. No rashes.          Labs:   CBC:  Recent Labs   Lab Test  07/17/17   1359   WBC  12.4*   RBC  4.78   HGB  14.5   HCT  42.7   MCV  89   MCH  30.3   MCHC  34.0   RDW  12.5   PLT  225       CMP:  Recent Labs   Lab Test  07/17/17   1359   NA  139   POTASSIUM  3.4   CHLORIDE   104   JASON  9.3   CO2  27   BUN  8   CR  0.72   GLC  110*   AST  18   ALT  34   BILITOTAL  0.8   ALBUMIN  3.9   PROTTOTAL  8.0   ALKPHOS  82            Imaging:   PENDING  Abdominal US and Retroperitoneal US : rule out appendicitis.           Assessment and Plan:   Arabella Vásquez is a 22 year old female presenting to the ED for evaluation of abdominal pain, vomiting, and hx of a recent treated UTI. She has been treated in the ED with IV antibiotics. She will be admitting to the ED observation unit under the abdominal pain protocol. She will need continued IV antibiotics, IVF rehydration, and further diagnostic testing.     Assessment/Plan:  1. Pylonephritis- Patient reports UTI two weeks ago and was prescribed 5 day course of Macrobid. Noted pain in her right lower quadrant and transitioned to her back starting yesterday evening at 6 pm. Reports nausea and vomiting since that time. Reports urinating more frequently but no pain with urination. Patient was started on Levaquin 750 mg IV x1 in the ED. She received Zofran, Toradol and 1L IV bolus. WBC  12.4. Afebrile. HCG negative.   - IV hydration with NS at 125 cc per hr  - IV antiemetics,   - IV Toradol 15 mg IV Q6H  - Regular diet  - Abdominal US and Retroperitoneal US : rule out appendicitis. (done and the appendix was not visualized).   -Serial  abdominal exams  - Follow UC  -Involve urology if an obstruction is found on the ultrasound (none was found)    0630 patient sleeping. Awakened and states she has no pain.     Discussed with Dr. Martinez    FEN: Regular Diet  Prophylaxis: young and moving well  Code Status: full    Kathrine Feng, APRN, CNP  ED Observation Unit

## 2017-07-18 VITALS
RESPIRATION RATE: 16 BRPM | HEART RATE: 92 BPM | TEMPERATURE: 98.4 F | DIASTOLIC BLOOD PRESSURE: 56 MMHG | WEIGHT: 143.2 LBS | SYSTOLIC BLOOD PRESSURE: 114 MMHG | OXYGEN SATURATION: 98 % | BODY MASS INDEX: 22.47 KG/M2 | HEIGHT: 67 IN

## 2017-07-18 PROCEDURE — 25000128 H RX IP 250 OP 636: Performed by: PHYSICIAN ASSISTANT

## 2017-07-18 PROCEDURE — 25000125 ZZHC RX 250: Performed by: NURSE PRACTITIONER

## 2017-07-18 PROCEDURE — 25000132 ZZH RX MED GY IP 250 OP 250 PS 637: Performed by: NURSE PRACTITIONER

## 2017-07-18 PROCEDURE — 25000128 H RX IP 250 OP 636: Performed by: NURSE PRACTITIONER

## 2017-07-18 PROCEDURE — 99207 ZZC APP CREDIT; MD BILLING SHARED VISIT: CPT | Performed by: PHYSICIAN ASSISTANT

## 2017-07-18 PROCEDURE — 99217 ZZC OBSERVATION CARE DISCHARGE: CPT | Mod: Z6 | Performed by: EMERGENCY MEDICINE

## 2017-07-18 PROCEDURE — 96367 TX/PROPH/DG ADDL SEQ IV INF: CPT

## 2017-07-18 PROCEDURE — G0378 HOSPITAL OBSERVATION PER HR: HCPCS

## 2017-07-18 PROCEDURE — 96366 THER/PROPH/DIAG IV INF ADDON: CPT

## 2017-07-18 RX ORDER — LEVOFLOXACIN 5 MG/ML
750 INJECTION, SOLUTION INTRAVENOUS EVERY 24 HOURS
Status: DISCONTINUED | OUTPATIENT
Start: 2017-07-18 | End: 2017-07-18 | Stop reason: HOSPADM

## 2017-07-18 RX ORDER — ONDANSETRON 4 MG/1
4 TABLET, ORALLY DISINTEGRATING ORAL EVERY 6 HOURS PRN
Qty: 20 TABLET | Refills: 0 | Status: SHIPPED | OUTPATIENT
Start: 2017-07-18 | End: 2018-08-31

## 2017-07-18 RX ORDER — LEVOFLOXACIN 250 MG/1
250 TABLET, FILM COATED ORAL DAILY
Qty: 12 TABLET | Refills: 0 | Status: SHIPPED | OUTPATIENT
Start: 2017-07-18 | End: 2018-08-31

## 2017-07-18 RX ADMIN — ACETAMINOPHEN 650 MG: 325 TABLET, FILM COATED ORAL at 13:02

## 2017-07-18 RX ADMIN — SODIUM CHLORIDE: 9 INJECTION, SOLUTION INTRAVENOUS at 04:57

## 2017-07-18 RX ADMIN — ONDANSETRON 4 MG: 4 TABLET, ORALLY DISINTEGRATING ORAL at 16:31

## 2017-07-18 RX ADMIN — SODIUM CHLORIDE: 9 INJECTION, SOLUTION INTRAVENOUS at 12:56

## 2017-07-18 RX ADMIN — LEVOFLOXACIN 750 MG: 5 INJECTION, SOLUTION INTRAVENOUS at 14:16

## 2017-07-18 NOTE — PLAN OF CARE
Problem: Discharge Planning  Goal: Discharge Planning (Adult, OB, Behavioral, Peds)  Outpatient/Observation goals to be met before discharge home:     - Diagnostic tests and consults completed and resulted if ordered - YES  - Vital signs normal or at patient baseline - YES  - Tolerating oral intake to maintain hydration - YES  - Adequate pain control on oral analgesia - YES  - Tolerating oral antibiotics or has plans for home infusion setup - Received IV antibiotics.  - Infection is improving - N/A  - Safe disposition plan has been identified - YES

## 2017-07-18 NOTE — PLAN OF CARE
Problem: Discharge Planning  Goal: Discharge Planning (Adult, OB, Behavioral, Peds)  Outpatient/Observation goals to be met before discharge home:     - Diagnostic tests and consults completed and resulted if ordered - NO  - Vital signs normal or at patient baseline - YES  - Tolerating oral intake to maintain hydration - YES  - Adequate pain control on oral analgesia - YES  - Tolerating oral antibiotics or has plans for home infusion setup - Received IV antibiotics.  - Infection is improving - N/A  - Safe disposition plan has been identified - YES

## 2017-07-18 NOTE — PROGRESS NOTES
VSS.  IV's removed.  Discharge instructions given and questions answered.  Pt. Able to verbalize understanding.  Pt. Has all belongings.  Boyfriend is driving pt. Home.

## 2017-07-18 NOTE — PROGRESS NOTES
"Emergency Medicine Observation Attending note    The patient was independently seen and examined by me. The chart, vital signs, and labs were reviewed. The patient's findings were discussed with the HALIE on the observation unit, and I agree with the findings of the note and the plan.    21 yo female, admitted to the observation unit for treatment of pyelonephritis. She was recently treated with a 5 day course of Macrobid for UTI, and dysuria resolved. She did have rapidly progressive development of RLQ and right flank pain, with significantly abnl UA found. No fevers, but she did have vomiting. No acute vaginal complaints. She has mult allergies, and was started on Levaquin and admitted for abx treated. Abd u/s obtained after admission to eval appendix, and it was not identified. Retroperitoneal u/s did not show hydro. This am she reports that she is sx free -- no pain or nausea. She ate breakfast.    BP 97/52 (BP Location: Left arm)  Pulse 72  Temp 98.6  F (37  C) (Oral)  Resp 16  Ht 1.702 m (5' 7\")  Wt 65 kg (143 lb 3.2 oz)  LMP 07/12/2017 (Exact Date)  SpO2 99%  Breastfeeding? No  BMI 22.43 kg/m2    Exam:  General: awake, alert, NAD  HEENT: NC/AT, oropharynx moist and clear  Neck: supple  Lungs: CTA-B  Heart: RRR, no M/R/G  Abd: soft, ND, minimal RLQ tenderness without guarding  Ext: non-tender, no edema        Assessment/Plan:  Pyelonephritis -- continue Levaquin. Appendicitis unlikely given rapid improvement of sx, no current pain, minimal tenderness. Will plan to d/c pt on oral abx after next dose of levaquin, if still feeling well.      "

## 2017-07-18 NOTE — DISCHARGE SUMMARY
"    [facility]  ED Observation Unit  Discharge Summary    Arabella Vásquez MRN# [id number]   Age: 22 year old YOB: 1995     Date of Admission:  7/17/2017  Date of Discharge:  7/18/2017   Admitting Physician:  Chelsea De La O MD  Discharging Provider:  Daniela Bernardo PA-C/Dee Mcneil MD  Primary Care Physician: Clay Steel          Reason for Admission:   RLQ and flank pain          Discharge Diagnoses:   1. Pyelonephritis  2. Recent UTI, treated with Macrobid  3. Right flank and RLQ pain, nausea and vomiting, resolved         Brief History of Illness:   Please see the detailed H & P dated 7/17/2017. Briefly, patient is a 22 year old female who presented for further evaluation of abdominal and back pain. She reported 4 episodes of non-bloody emesis, with ongoign diaphoresis. She was recently treated for a UTI with 5 day course of Macrobid about two weeks ago.          Physical Exam:   BP 97/57 (BP Location: Left arm)  Pulse 77  Temp 98.6  F (37  C) (Oral)  Resp 16  Ht 1.702 m (5' 7\")  Wt 65 kg (143 lb 3.2 oz)  LMP 07/12/2017 (Exact Date)  SpO2 98%  Breastfeeding? No  BMI 22.43 kg/m2  GENERAL: Alert and oriented x 3. NAD.   HEENT: Anicteric sclera. Mucous membranes moist.   CV: RRR. S1, S2. No murmurs appreciated.   RESPIRATORY: Effort normal on room air. Lungs CTAB with no wheezing, rales, rhonchi.   GI: Abdomen soft and non distended with normoactive bowel sounds present in all quadrants. Minimal RLQ tenderness, no guarding  EXTREMITIES: No peripheral edema. Intact bilateral pedal pulses.   SKIN: No jaundice. No rashes.           Labs:   Last CBC:   Recent Labs   Lab Test  07/17/17   1359   WBC  12.4*   RBC  4.78   HGB  14.5   HCT  42.7   MCV  89   MCH  30.3   MCHC  34.0   RDW  12.5   PLT  225       Last CMP:  Recent Labs   Lab Test  07/17/17   1359   NA  139   POTASSIUM  3.4   CHLORIDE  104   JASON  9.3   CO2  27   BUN  8   CR  0.72   GLC  110*   AST  18 "   ALT  34   BILITOTAL  0.8   ALBUMIN  3.9   PROTTOTAL  8.0   ALKPHOS  82            Imaging:   Retroperitoneal ultrasound (7/17/17):   1. Debris within the urinary bladder, concerning for infection.  2. No abnormalities of the kidneys. No perinephric fluid collections or abscesses.    Limited abdominal ultrasound (7/17/17):   Appendix not identified within the right lower quadrant. No free fluid. No fluid collections or abscesses. No solid lesions.           Procedures:   None        Consultations:   None         Hospital Course:   1. Acute uncomplicated pyelonephritis. With relatively abrupt onset of right flank and RLQ pain, with nausea and vomiting. WBC elevated to 12.4 on admission. UA with moderate blood, large LE and >182 WBCs. Urine culture growing >100,000 E. Coli. Retroperitoneal US did not show hydronephrosis. She has been afebrile overnight and this AM. She has history of GI distress with PCN, Cephalosporins and Bactrim. She has tolerated IV levaquin.  - Anticipate discharge with oral levaquin 250 mg daily x 14 days  - Advise follow up with primary care within 1-2 weeks of discharge    2. R flank pain, RLQ pain, nausea and vomiting. Symptoms significantly improved this AM after IV abx yesterday and IVF overnight. She is tolerating oral intake without difficulty. Abd ultrasound did not visualize appendix, though given rapid improvement in symptoms, will not expose her to radiation of CT scan.             Discharge Medications:     Current Discharge Medication List      START taking these medications    Details   ondansetron (ZOFRAN-ODT) 4 MG ODT tab Take 1 tablet (4 mg) by mouth every 6 hours as needed for nausea or vomiting  Qty: 20 tablet, Refills: 0    Associated Diagnoses: Pyelonephritis; Nausea      levofloxacin (LEVAQUIN) 250 MG tablet Take 1 tablet (250 mg) by mouth daily  Qty: 12 tablet, Refills: 0    Associated Diagnoses: Acute pyelonephritis         CONTINUE these medications which have NOT  CHANGED    Details   desogestrel-ethinyl estradiol (APRI) 0.15-30 MG-MCG per tablet Take 1 tablet by mouth daily                  Discharge Disposition:   Discharged to home    Code status on discharge: Full Code              Discharge Instructions:     Discharge Procedure Orders  Reason for your hospital stay   Order Comments: You were hospitalized due to a bladder and kidney infection. This has improved with IV antibiotics. Please continue oral antibiotics to complete a 14 day total course.     Adult [facility] Follow-up and recommended labs and tests   Order Comments: Follow up with primary care provider, RYNE MERRILL, within 7 days for hospital follow-up and to review urine culture results.     Appointments on [place] and/or [facility] (with [facility] or [facility] provider or service). Call [phone] if you haven't heard regarding these appointments within 7 days of discharge.     Activity   Order Comments: Your activity upon discharge: activity as tolerated, please limit strenuous activity, seek medical care if any new muscle or tendon pain.   Order Specific Question Answer Comments   Is discharge order? Yes      When to contact your care team   Order Comments: Call your primary doctor if you have any of the following: temperature greater than 100.4F, increased nausea or increased abdominal pain.     Full Code     Diet   Order Comments: Follow this diet upon discharge: Regular Diet   Order Specific Question Answer Comments   Is discharge order? Yes           Patient evaluated by Dr. Mcneil on day of discharge; in agreement with plan of care.     Daniela Bernardo  [facility]  Pager: [phone]

## 2017-07-19 LAB
BACTERIA SPEC CULT: ABNORMAL
MICRO REPORT STATUS: ABNORMAL
MICROORGANISM SPEC CULT: ABNORMAL
SPECIMEN SOURCE: ABNORMAL

## 2018-08-31 ENCOUNTER — OFFICE VISIT (OUTPATIENT)
Dept: OBGYN | Facility: CLINIC | Age: 23
End: 2018-08-31
Attending: NURSE PRACTITIONER
Payer: COMMERCIAL

## 2018-08-31 VITALS
HEART RATE: 74 BPM | BODY MASS INDEX: 22.44 KG/M2 | SYSTOLIC BLOOD PRESSURE: 118 MMHG | WEIGHT: 143 LBS | DIASTOLIC BLOOD PRESSURE: 75 MMHG | HEIGHT: 67 IN

## 2018-08-31 DIAGNOSIS — Z80.3 FAMILY HISTORY OF MALIGNANT NEOPLASM OF BREAST: ICD-10-CM

## 2018-08-31 DIAGNOSIS — Z00.00 VISIT FOR PREVENTIVE HEALTH EXAMINATION: Primary | ICD-10-CM

## 2018-08-31 DIAGNOSIS — R10.2 LEFT ADNEXAL TENDERNESS: ICD-10-CM

## 2018-08-31 DIAGNOSIS — N94.10 DYSPAREUNIA IN FEMALE: ICD-10-CM

## 2018-08-31 DIAGNOSIS — Z30.41 ENCOUNTER FOR SURVEILLANCE OF CONTRACEPTIVE PILLS: ICD-10-CM

## 2018-08-31 RX ORDER — DESOGESTREL AND ETHINYL ESTRADIOL 0.15-0.03
1 KIT ORAL DAILY
Qty: 28 TABLET | Status: CANCELLED | OUTPATIENT
Start: 2018-08-31

## 2018-08-31 RX ORDER — DESOGESTREL AND ETHINYL ESTRADIOL 0.15-0.03
1 KIT ORAL DAILY
Qty: 84 TABLET | Refills: 3 | Status: SHIPPED | OUTPATIENT
Start: 2018-08-31 | End: 2019-07-26

## 2018-08-31 ASSESSMENT — PAIN SCALES - GENERAL: PAINLEVEL: NO PAIN (0)

## 2018-08-31 NOTE — MR AVS SNAPSHOT
After Visit Summary   8/31/2018    Arabella Vásquez    MRN: [id number]           Patient Information     Date Of Birth          1995        Visit Information        Provider Department      8/31/2018 1:00 PM Paige Alex APRN CNP [facility]        Today's Diagnoses     Visit for preventive health examination    -  1    Encounter for surveillance of contraceptive pills        Left adnexal tenderness        Dyspareunia in female          Care Instructions    Refill provided for your birth control pills.  Schedule pelvic exam at your convenience   - you can call [phone] to schedule or schedule at   Notify provider if you have bleeding after intercourse    Discussed healthy dietary recommendations including 1200mg Calcium per day    Mammograms to start at age 30  Pap smear next due in 2 yrs.      PREVENTIVE HEALTH RECOMMENDATIONS:   Most women need a yearly breast and pelvic exam.    A PAP screen, a test done DURING a pelvic exam, is NO longer recommended yearly.    March 2013, screening guidelines recommended by ACOG for PAP screen are:    1) First pap at age 21.    2) Pap every 3 years until age 30.    3) After age 30, pap every 3 years or Pap with HR HPV screen every 5 years until age 65.  4) Women do NOT need a vaginal Pap screen after a hysterectomy (surgical removal of the uterus) when they have not had cancer.    Exceptions:  1) Yearly pap if HIV+ or immunosuppressed secondary to organ transplant  2) LINN II-III continue routine screening for 20 years.    I encourage you continue looking for opportunities to choose a healthy lifestyle:       * Choose to eat a heart healthy diet. Check out the FOOD PLATE guidelines at: http://www.choosemyplate.gov/ for helpful hints on weight and cholesterol management.  Balance your caloric intake with exercise to maintain a BMI in the 22 to 26 range. For bone health: Eat calcium-rich foods like yogurt, broccoli or  take chewable calcium pills (500 to 600 mg) twice a day with food.       * Exercise for at least an average of 30 minutes a day, 5 days of the week. This will help you control your weight, release stress, and help prevent disease.      * Take a Vitamin D3 supplement daily fall through spring and during summer unless you [id number]' full body sun exposure to skin without sunscreen.      * DO wear sunscreen to prevent skin cancer after the first 15-30 minutes.      * Identify stressors in your life, find ways to release the stress, and, make time for yourself. PLEASE ask for help if mood changes last longer than two weeks.     * Limit alcohol to one drink per day.  No smoking.  Avoid second hand smoke. If you smoke, ask for help to stop.       *  If you are in a sexual relationship, talk with your partner about possible infection risks and take action to protect yourself from exposure to a sexual infection.    Please request an infection screen for STIs (sexually transmitted infections) if you are less than age 26 OR believe that you may be at risk.     Get a flu shot each year. Get a tetanus shot every 10 years. EVERYONE needs a pertussis (Whooping cough) booster.    See your dentist twice a year for an exam and preventive care cleaning.     Consider the following screen tests:    1) cholesterol test every 5 years.     2) yearly mammogram after age 40 unless you have identified risks.    3) colonoscopy every 10 years after age 50 unless you have identified risks.    4) diabetes blood test screening if you are at risk for diabetes.      Additional information that you may also find helpful:  The Internet now gives us access to LOTS of information -- some of it helpful, research documented and also plenty of harmful, anecdotal information that may not pertain to your situtaion. Consider visiting the following websites for accurate health information:    www.vitamindcouncil.org/ : Info and ongoing research re Vitamin  D    www.fairview.org : Up to date and easily searchable information on multiple topics.    www.medlineplus.gov : medication info, interactive tutorials, watch real surgeries online    www.cdc.gov : public health info, travel advisories, epidemics (H1N1)    www.dexter/std.org: current research re diagnosis, treatment and prevention of sexually contacted infections.    www.health.[facility].[place] : [place] dept [facility], public health issues in [place], N1N1    www.familydoctor.org : good info from the Academy of Family Physicians                Follow-ups after your visit        Follow-up notes from your care team     Return in 1 year (on 2019) for Preventative Health Visit.      Future tests that were ordered for you today     Open Future Orders        Priority Expected Expires Ordered    US GYN Complete Transvaginal - 50592 (In Clinic) Routine  2018            Who to contact     Please call your clinic at [phone] to:    Ask questions about your health    Make or cancel appointments    Discuss your medicines    Learn about your test results    Speak to your doctor            Additional Information About Your Visit        [employer] Information     [employer] is an electronic gateway that provides easy, online access to your medical records. With [employer], you can request a clinic appointment, read your test results, renew a prescription or communicate with your care team.     To sign up for [employer] visit the website at [email or web address]/"[email or web address]   You will be asked to enter the access code listed below, as well as some personal information. Please follow the directions to create your username and password.     Your access code is: [email or web address]  Expires: 11/15/2018  6:30 AM     Your access code will  in 90 days. If you need help or a new code, please contact your [facility] or call [phone] for assistance.        Care EveryWhere ID     This is your Care EveryWhere ID.  "This could be used by other organizations to access your [place] medical records  [id number]        Your Vitals Were     Pulse Height Last Period Breastfeeding? BMI (Body Mass Index)       74 1.708 m (5' 7.25\") 08/15/2018 No 22.23 kg/m2        Blood Pressure from Last 3 Encounters:   08/31/18 118/75   07/18/17 114/56    Weight from Last 3 Encounters:   08/31/18 64.9 kg (143 lb)   07/17/17 65 kg (143 lb 3.2 oz)                 Today's Medication Changes          These changes are accurate as of 8/31/18  1:56 PM.  If you have any questions, ask your nurse or doctor.               These medicines have changed or have updated prescriptions.        Dose/Directions    * desogestrel-ethinyl estradiol 0.15-30 MG-MCG per tablet   Commonly known as:  APRI   Indication:  Birth Control Treatment   This may have changed:  Another medication with the same name was added. Make sure you understand how and when to take each.   Changed by:  Paige Alex APRN CNP        Dose:  1 tablet   Take 1 tablet by mouth daily   Refills:  0       * desogestrel-ethinyl estradiol 0.15-30 MG-MCG per tablet   Commonly known as:  APRI   This may have changed:  You were already taking a medication with the same name, and this prescription was added. Make sure you understand how and when to take each.   Used for:  Encounter for surveillance of contraceptive pills   Changed by:  Paige Alex APRN CNP        Dose:  1 tablet   Take 1 tablet by mouth daily   Quantity:  84 tablet   Refills:  3       * Notice:  This list has 2 medication(s) that are the same as other medications prescribed for you. Read the directions carefully, and ask your doctor or other care provider to review them with you.      Stop taking these medicines if you haven't already. Please contact your care team if you have questions.     levofloxacin 250 MG tablet   Commonly known as:  LEVAQUIN   Stopped by:  Paige Alex APRN CNP           " ondansetron 4 MG ODT tab   Commonly known as:  ZOFRAN-ODT   Stopped by:  Paige Alex APRN CNP                Where to get your medicines      These medications were sent to [facility] [place], [place] - [address] [place] [address]  [address] [facility], [facility] [address]     Phone:  [phone]     desogestrel-ethinyl estradiol 0.15-30 MG-MCG per tablet                Primary Care Provider    Clay Steel       No address on file        Equal Access to Services     [facility] Global Medical CenterBRIANNE : Hadii aad ku hadasho Soomaali, waaxda luqadaha, qaybta kaalmada adeegyada, waxay idiin hayaan adeeg kharalon alfaro . So [facility] [phone].    ATENCIÓN: Si habla español, tiene a kendall disposición servicios gratuitos de asistencia lingüística. Llame al [phone].    We comply with applicable federal civil rights laws and [place] laws. We do not discriminate on the basis of race, color, national origin, age, disability, sex, sexual orientation, or gender identity.            Thank you!     Thank you for choosing [facility]  for your care. Our goal is always to provide you with excellent care. Hearing back from our patients is one way we can continue to improve our services. Please take a few minutes to complete the written survey that you may receive in the mail after your visit with us. Thank you!             Your Updated Medication List - Protect others around you: Learn how to safely use, store and throw away your medicines at www.disposemymeds.org.          This list is accurate as of 8/31/18  1:56 PM.  Always use your most recent med list.                   Brand Name Dispense Instructions for use Diagnosis    * desogestrel-ethinyl estradiol 0.15-30 MG-MCG per tablet    APRI     Take 1 tablet by mouth daily        * desogestrel-ethinyl estradiol 0.15-30 MG-MCG per tablet    APRI    84 tablet    Take 1 tablet by mouth daily    Encounter for surveillance of contraceptive pills       * Notice:   This list has 2 medication(s) that are the same as other medications prescribed for you. Read the directions carefully, and ask your doctor or other care provider to review them with you.

## 2018-08-31 NOTE — LETTER
2018       RE: Arabella Vásquez   [address]  [facility] [address]     Dear Colleague,    Thank you for referring your patient, Arabella Vásquez, to the [facility] at [facility]. Please see a copy of my visit note below.      Progress Note    SUBJECTIVE:  Arabella Vásquez is an 23 year old, , who requests an Annual Preventive Exam.     Concerns today include:   1. Refill on COCs: Currently taking Cyred COCs; has withdrawal that lasts for 4 days with moderate to light bleeding; no intermenstrual bleeding; menses are accompanied by minimal cramping; pt has no trouble remembering to take the pills everyday    2. Mother diagnosed with breast cancer (estrogen receptor pos. BRCA neg) at age 40; she had a recurrence at age 45: Pt was previously advised to plan to start mammograms at age 31.      Sexual hx:   - Reports mild pain with deep penetration; first noted this 1 month ago; has only happened on occasion, and in one position. Denies post-coital bleeding or pelvic pain outside of intercourse.   - Has had STD testing done since becoming sexually active with her current partner who she has been with for 2 yrs.  Reports they are in a monogamous relationship    Social Hx: Attended the [employer], grew up in [place], near [place]. Works at a an [employer] in [place]. Enjoys her work.     Menstrual History:  Menstrual History 2017   LAST MENSTRUAL PERIOD 2017 8/15/2018     Last pap smear: 1 yr ago;  Normal  - will request records   History of abnormal Pap smear: NO - age 21-29 PAP every 3 years recommended    Last Colonoscopy: n/a    HISTORY:    Current Outpatient Prescriptions on File Prior to Visit:  desogestrel-ethinyl estradiol (APRI) 0.15-30 MG-MCG per tablet Take 1 tablet by mouth daily     No current facility-administered medications on file prior to visit.   Allergies   Allergen Reactions      "Amoxicillin Hives     Bactrim [Sulfamethoxazole W/Trimethoprim] Nausea and Vomiting     Cefzil [Cefprozil] Hives     Clindamycin Nausea and Vomiting       There is no immunization history on file for this patient.  S/p HPV vaccines     Obstetric History       T0      L0     SAB0   TAB0   Ectopic0   Multiple0   Live Births0      Past Medical History:   Diagnosis Date     Scoliosis      Past Surgical History:   Procedure Laterality Date     C EACH ADD TOOTH EXTRACTION      wisdom teeth extracted     Family History   Problem Relation Age of Onset     Breast Cancer Mother      x 2, ages 40 and 45. BRCA negative     Colon Cancer No family hx of      Diabetes No family hx of      Social History     Social History     Marital status: Single     Spouse name: N/A     Number of children: N/A     Years of education: N/A     Social History Main Topics     Smoking status: Never Smoker     Smokeless tobacco: Never Used     Alcohol use 0.6 oz/week     1 Glasses of wine per week     Drug use: No     Sexual activity: Yes     Partners: Male     Birth control/ protection: Pill     ROS  ROS: 10 point ROS neg other than the symptoms noted above in the HPI.    PHQ-9 score: 0  CLAUDINE-7 score: 0  HARK score: negative (no hx of abuse; feels safe)    EXAM:  Blood pressure 118/75, pulse 74, height 1.708 m (5' 7.25\"), weight 64.9 kg (143 lb), last menstrual period 08/15/2018, not currently breastfeeding. Body mass index is 22.23 kg/(m^2).  General - pleasant female in no acute distress.  Skin - no suspicious lesions or rashes  EENT-  PERRLA, euthyroid with out palpable nodules  Neck - supple without lymphadenopathy.  Lungs - clear to auscultation bilaterally.  Heart - regular rate and rhythm without murmur.  Abdomen - soft, nontender, nondistended, no masses or organomegaly noted.  Musculoskeletal - no gross deformities.  Neurological - normal strength, sensation, and mental status.    Breast Exam:  Breast: Without visible skin " changes. No dimpling or lesions seen.   Breasts supple, non-tender with palpation, no dominant mass, nodularity, or nipple discharge noted bilaterally. Axillary nodes negative.      Pelvic Exam:   EG/BUS: Normal genital architecture without lesions, erythema or abnormal secretions; Bartholin's, Urethra, Upton's normal   Urethral meatus: normal   Urethra: no masses, tenderness, or scarring   Bladder: no masses or tenderness   Vagina: moist, pink, rugae with creamy, white and odorless secretions  Cervix: Nulliparous, and pink, moist, closed, without lesion or CMT  Uterus: anteverted,  and small, smooth, firm, mobile w/o pain  Adnexa: mild left adnexal tenderness on exam; right adnexa within normal limits; No masses, nodularity  Rectum: anus normal       ASSESSMENT:  Encounter Diagnoses   Name Primary?     Encounter for surveillance of contraceptive pills      Visit for preventive health examination Yes     Left adnexal tenderness      Dyspareunia in female      Family history of malignant neoplasm of breast         PLAN:   Orders Placed This Encounter   Procedures     US GYN Complete Transvaginal - 46512 (In Clinic)     Orders Placed This Encounter   Medications     desogestrel-ethinyl estradiol (APRI) 0.15-30 MG-MCG per tablet     Sig: Take 1 tablet by mouth daily     Dispense:  84 tablet     Refill:  3     Refill provided for Apri birth control pills.  Pt to schedule pelvic ultrasound to evaluate dyspareunia and left adnexal tenderness on exam. Pt will be called with the results.   Mammograms to start at age 30 given family history of mother with breast cancer.   Pap smear next due in 2 yrs. Will request records from last pap smear (done at other clinic)  Additional teaching done at this visit regarding calcium (1200 mg per day), self breast exam, exercise, birth control, mental health and weight/diet.    Return to clinic in one year.  Follow-up as needed.    Paige Alex, JARON, APRN, WHNP            Again,  thank you for allowing me to participate in the care of your patient.      Sincerely,    GWEN Garrett CNP

## 2018-08-31 NOTE — PATIENT INSTRUCTIONS
Refill provided for your birth control pills.  Schedule pelvic ultrasound at your convenience   - you can call [phone] to schedule or schedule at   Notify provider if you have bleeding after intercourse    Discussed healthy dietary recommendations including 1200mg Calcium per day    Mammograms to start at age 30  Pap smear next due in 2 yrs.      PREVENTIVE HEALTH RECOMMENDATIONS:   Most women need a yearly breast and pelvic exam.    A PAP screen, a test done DURING a pelvic exam, is NO longer recommended yearly.    March 2013, screening guidelines recommended by ACOG for PAP screen are:    1) First pap at age 21.    2) Pap every 3 years until age 30.    3) After age 30, pap every 3 years or Pap with HR HPV screen every 5 years until age 65.  4) Women do NOT need a vaginal Pap screen after a hysterectomy (surgical removal of the uterus) when they have not had cancer.    Exceptions:  1) Yearly pap if HIV+ or immunosuppressed secondary to organ transplant  2) LINN II-III continue routine screening for 20 years.    I encourage you continue looking for opportunities to choose a healthy lifestyle:       * Choose to eat a heart healthy diet. Check out the FOOD PLATE guidelines at: http://www.choosemyplate.gov/ for helpful hints on weight and cholesterol management.  Balance your caloric intake with exercise to maintain a BMI in the 22 to 26 range. For bone health: Eat calcium-rich foods like yogurt, broccoli or take chewable calcium pills (500 to 600 mg) twice a day with food.       * Exercise for at least an average of 30 minutes a day, 5 days of the week. This will help you control your weight, release stress, and help prevent disease.      * Take a Vitamin D3 supplement daily fall through spring and during summer unless you [id number]' full body sun exposure to skin without sunscreen.      * DO wear sunscreen to prevent skin cancer after the first 15-30 minutes.      * Identify stressors in your life, find  ways to release the stress, and, make time for yourself. PLEASE ask for help if mood changes last longer than two weeks.     * Limit alcohol to one drink per day.  No smoking.  Avoid second hand smoke. If you smoke, ask for help to stop.       *  If you are in a sexual relationship, talk with your partner about possible infection risks and take action to protect yourself from exposure to a sexual infection.    Please request an infection screen for STIs (sexually transmitted infections) if you are less than age 26 OR believe that you may be at risk.     Get a flu shot each year. Get a tetanus shot every 10 years. EVERYONE needs a pertussis (Whooping cough) booster.    See your dentist twice a year for an exam and preventive care cleaning.     Consider the following screen tests:    1) cholesterol test every 5 years.     2) yearly mammogram after age 40 unless you have identified risks.    3) colonoscopy every 10 years after age 50 unless you have identified risks.    4) diabetes blood test screening if you are at risk for diabetes.      Additional information that you may also find helpful:  The Internet now gives us access to LOTS of information -- some of it helpful, research documented and also plenty of harmful, anecdotal information that may not pertain to your situtaion. Consider visiting the following websites for accurate health information:    www.vitamindcouncil.org/ : Info and ongoing research re Vitamin D    www.fairview.org : Up to date and easily searchable information on multiple topics.    www.medlineplus.gov : medication info, interactive tutorials, watch real surgeries online    www.cdc.gov : public health info, travel advisories, epidemics (H1N1)    www.dexter/std.org: current research re diagnosis, treatment and prevention of sexually contacted infections.    www.health.state.[place].us : [place] dept of heatlh, public health issues in [place], N1N1    www.familydoctor.org : good info from the Academy of Family  Physicians

## 2018-08-31 NOTE — NURSING NOTE
Chief Complaint   Patient presents with     [facility]     Annual B/C consult   Estrella Tinsley LPN

## 2018-08-31 NOTE — LETTER
Date:September 18, 2018      Patient was self referred, no letter generated. Do not send.        [facility] Information

## 2018-08-31 NOTE — PROGRESS NOTES
Progress Note    SUBJECTIVE:  Arabella Vásquez is an 23 year old, , who requests an Annual Preventive Exam.     Concerns today include:   1. Refill on COCs: Currently taking Cyred COCs; has withdrawal that lasts for 4 days with moderate to light bleeding; no intermenstrual bleeding; menses are accompanied by minimal cramping; pt has no trouble remembering to take the pills everyday    2. Mother diagnosed with breast cancer (estrogen receptor pos. BRCA neg) at age 40; she had a recurrence at age 45: Pt was previously advised to plan to start mammograms at age 31.      Sexual hx:   - Reports mild pain with deep penetration; first noted this 1 month ago; has only happened on occasion, and in one position. Denies post-coital bleeding or pelvic pain outside of intercourse.   - Has had STD testing done since becoming sexually active with her current partner who she has been with for 2 yrs.  Reports they are in a monogamous relationship    Social Hx: Attended the [employer] [facility] [employer], grew up in [place], near [place]. Works at a [employer] in [facility]. Enjoys her work.     Menstrual History:  Menstrual History 2017   LAST MENSTRUAL PERIOD 2017 8/15/2018     Last pap smear: 1 yr ago;  Normal  - will request records   History of abnormal Pap smear: NO - age 21-29 PAP every 3 years recommended    Last Colonoscopy: n/a    HISTORY:    Current Outpatient Prescriptions on File Prior to Visit:  desogestrel-ethinyl estradiol (APRI) 0.15-30 MG-MCG per tablet Take 1 tablet by mouth daily     No current facility-administered medications on file prior to visit.   Allergies   Allergen Reactions     Amoxicillin Hives     Bactrim [Sulfamethoxazole W/Trimethoprim] Nausea and Vomiting     Cefzil [Cefprozil] Hives     Clindamycin Nausea and Vomiting       There is no immunization history on file for this patient.  S/p HPV vaccines     Obstetric History       T0      L0     SAB0    "TAB0   Ectopic0   Multiple0   Live Births0      Past Medical History:   Diagnosis Date     Scoliosis      Past Surgical History:   Procedure Laterality Date     C EACH ADD TOOTH EXTRACTION      wisdom teeth extracted     Family History   Problem Relation Age of Onset     Breast Cancer Mother      x 2, ages 40 and 45. BRCA negative     Colon Cancer No family hx of      Diabetes No family hx of      Social History     Social History     Marital status: Single     Spouse name: N/A     Number of children: N/A     Years of education: N/A     Social History Main Topics     Smoking status: Never Smoker     Smokeless tobacco: Never Used     Alcohol use 0.6 oz/week     1 Glasses of wine per week     Drug use: No     Sexual activity: Yes     Partners: Male     Birth control/ protection: Pill     ROS  ROS: 10 point ROS neg other than the symptoms noted above in the HPI.    PHQ-9 score: 0  CLAUDINE-7 score: 0  HARK score: negative (no hx of abuse; feels safe)    EXAM:  Blood pressure 118/75, pulse 74, height 1.708 m (5' 7.25\"), weight 64.9 kg (143 lb), last menstrual period 08/15/2018, not currently breastfeeding. Body mass index is 22.23 kg/(m^2).  General - pleasant female in no acute distress.  Skin - no suspicious lesions or rashes  EENT-  PERRLA, euthyroid with out palpable nodules  Neck - supple without lymphadenopathy.  Lungs - clear to auscultation bilaterally.  Heart - regular rate and rhythm without murmur.  Abdomen - soft, nontender, nondistended, no masses or organomegaly noted.  Musculoskeletal - no gross deformities.  Neurological - normal strength, sensation, and mental status.    Breast Exam:  Breast: Without visible skin changes. No dimpling or lesions seen.   Breasts supple, non-tender with palpation, no dominant mass, nodularity, or nipple discharge noted bilaterally. Axillary nodes negative.      Pelvic Exam:   EG/BUS: Normal genital architecture without lesions, erythema or abnormal secretions; Bartholin's, " Urethra, Lemmon's normal   Urethral meatus: normal   Urethra: no masses, tenderness, or scarring   Bladder: no masses or tenderness   Vagina: moist, pink, rugae with creamy, white and odorless secretions  Cervix: Nulliparous, and pink, moist, closed, without lesion or CMT  Uterus: anteverted,  and small, smooth, firm, mobile w/o pain  Adnexa: mild left adnexal tenderness on exam; right adnexa within normal limits; No masses, nodularity  Rectum: anus normal       ASSESSMENT:  Encounter Diagnoses   Name Primary?     Encounter for surveillance of contraceptive pills      Visit for preventive health examination Yes     Left adnexal tenderness      Dyspareunia in female      Family history of malignant neoplasm of breast         PLAN:   Orders Placed This Encounter   Procedures     US GYN Complete Transvaginal - 86512 (In Clinic)     Orders Placed This Encounter   Medications     desogestrel-ethinyl estradiol (APRI) 0.15-30 MG-MCG per tablet     Sig: Take 1 tablet by mouth daily     Dispense:  84 tablet     Refill:  3     Refill provided for Apri birth control pills.  Pt to schedule pelvic ultrasound to evaluate dyspareunia and left adnexal tenderness on exam. Pt will be called with the results.   Mammograms to start at age 31 given family history of mother with breast cancer.   Pap smear next due in 2 yrs. Will request records from last pap smear (done at other clinic)  Additional teaching done at this visit regarding calcium (1200 mg per day), self breast exam, exercise, birth control, mental health and weight/diet.    Return to clinic in one year.  Follow-up as needed.    Paige Alex, DNP, APRN, WHNP

## 2018-09-16 PROBLEM — Z80.3 FAMILY HISTORY OF MALIGNANT NEOPLASM OF BREAST: Status: ACTIVE | Noted: 2018-09-16

## 2018-10-25 ENCOUNTER — OFFICE VISIT (OUTPATIENT)
Dept: OBGYN | Facility: CLINIC | Age: 23
End: 2018-10-25
Attending: NURSE PRACTITIONER
Payer: COMMERCIAL

## 2018-10-25 VITALS
BODY MASS INDEX: 22.54 KG/M2 | SYSTOLIC BLOOD PRESSURE: 121 MMHG | WEIGHT: 145 LBS | DIASTOLIC BLOOD PRESSURE: 81 MMHG | HEART RATE: 94 BPM

## 2018-10-25 DIAGNOSIS — R39.15 URINARY URGENCY: ICD-10-CM

## 2018-10-25 DIAGNOSIS — Z23 NEED FOR VACCINATION: ICD-10-CM

## 2018-10-25 DIAGNOSIS — N89.8 VAGINAL ITCHING: ICD-10-CM

## 2018-10-25 DIAGNOSIS — B37.31 VULVOVAGINAL CANDIDIASIS: Primary | ICD-10-CM

## 2018-10-25 LAB
ALBUMIN UR-MCNC: NEGATIVE MG/DL
APPEARANCE UR: CLEAR
BILIRUB UR QL STRIP: NEGATIVE
CLUE CELLS: NORMAL
COLOR UR AUTO: YELLOW
GLUCOSE UR STRIP-MCNC: NEGATIVE MG/DL
HGB UR QL STRIP: ABNORMAL
KETONES UR STRIP-MCNC: NEGATIVE MG/DL
LEUKOCYTE ESTERASE UR QL STRIP: NEGATIVE
NITRATE UR QL: NEGATIVE
PH UR STRIP: 7 PH (ref 5–7)
SP GR UR STRIP: 1.01 (ref 1–1.03)
TRICHOMONAS (WET PREP): NORMAL
UROBILINOGEN UR STRIP-ACNC: 0.2 EU/DL (ref 0.2–1)
YEAST (WET PREP): NORMAL

## 2018-10-25 PROCEDURE — G0463 HOSPITAL OUTPT CLINIC VISIT: HCPCS | Mod: ZF

## 2018-10-25 PROCEDURE — 90686 IIV4 VACC NO PRSV 0.5 ML IM: CPT | Mod: ZF

## 2018-10-25 PROCEDURE — 81003 URINALYSIS AUTO W/O SCOPE: CPT

## 2018-10-25 PROCEDURE — G0008 ADMIN INFLUENZA VIRUS VAC: HCPCS | Mod: ZF

## 2018-10-25 PROCEDURE — 87210 SMEAR WET MOUNT SALINE/INK: CPT | Mod: ZF | Performed by: NURSE PRACTITIONER

## 2018-10-25 PROCEDURE — 25000128 H RX IP 250 OP 636: Mod: ZF

## 2018-10-25 RX ORDER — FLUCONAZOLE 150 MG/1
150 TABLET ORAL ONCE
Qty: 1 TABLET | Refills: 0 | Status: SHIPPED | OUTPATIENT
Start: 2018-10-25 | End: 2018-10-25

## 2018-10-25 RX ORDER — FLUCONAZOLE 150 MG/1
150 TABLET ORAL
Qty: 2 TABLET | Refills: 0 | Status: SHIPPED | OUTPATIENT
Start: 2018-10-25 | End: 2019-02-21

## 2018-10-25 ASSESSMENT — PAIN SCALES - GENERAL: PAINLEVEL: NO PAIN (0)

## 2018-10-25 NOTE — PATIENT INSTRUCTIONS
Take 1 dose of Diflucan by mouth today to treat your yeast infection. Notify provider if symptoms do not improve.    Flu vaccine was administered today

## 2018-10-25 NOTE — LETTER
10/25/2018       RE: Arabella Vásquez   [address]  [facility] [address]     Dear Colleague,    Thank you for referring your patient, Arabella Vásquez, to the [facility] at [facility]. Please see a copy of my visit note below.    Subjective:  Arabella Vásquez is a 23 yr old female, , who has been experiencing vaginal itching and increase in discharge x 1 week. Describes discharge as clear, thin, and sticky, with no odor.  Has some vulvovaginal burning after scratching. Also experienced vaginal burning when she attempted to have intercourse Friday night.  Endorses some urinary urgency and an odor to her urine. She denies abdominal or pelvic pain, fever, urinary frequency, urinary incontinence, and CVA tenderness.      She states that she has had yeast infections in the past and has been treated at [facility] with a pill and a cream and these have immediately stopped her symptoms.  She has also used Monistat 1 day in the past when similar symptoms were experienced, with complete relief. She tried Monistat 1 day on Saturday night (5 days ago) and  she felt a little better. Monday her symptoms returned, however.     Arabella also requests an ultrasound to be ordered to follow-up on a previous ovarian cyst that was noted on an ultrasound she had (these records are not available at today's visit). Arabella had an U/S order placed at her visit on 2018, after noting left adnexal tenderness on exam, but she never scheduled this. She denies pelvic pain or pain during intercourse. Reports that the discomfort she previously had when the cyst was noted on U/S is no longer present.     Contraception: Apri COCs, has a withdrawal bleed that lasts for 4 days with moderate to light bleeding.     Sexual hx: in a monogamous relationship with a male partner     Patient's last menstrual period was 10/10/2018.    Current Outpatient Prescriptions   Medication      desogestrel-ethinyl estradiol (APRI) 0.15-30 MG-MCG per tablet     fluconazole (DIFLUCAN) 150 MG tablet     No current facility-administered medications for this visit.      Past Medical History:   Diagnosis Date     Pyelonephritis 07/2018     Scoliosis      Past Surgical History:   Procedure Laterality Date     C EACH ADD TOOTH EXTRACTION      wisdom teeth extracted     Family History   Problem Relation Age of Onset     Breast Cancer Mother      x 2, ages 40 and 45. BRCA negative     Colon Cancer No family hx of      Diabetes No family hx of        Objective  /81  Pulse 94  Wt 65.8 kg (145 lb)  LMP 10/10/2018  Breastfeeding? No  BMI 22.54 kg/m2  General: Pleasant female in no apparent distress  Abdomen: soft, non-tender; no evidence of CVA tenderness  Pelvic Exam:  Vulva: No external lesions, normal hair distribution, no adenopathy  Vagina: mild erythema at introitus;  Moist, pink, white; adherent discharge present, well rugated, no lesions  Cervix: nulliparous, smooth, pink, no visible lesions  Uterus: Normal size, anteverted, non-tender, mobile  Ovaries: No mass, non-tender, mobile  Rectal exam: anus without mass    Wet prep: pos for yeast; neg for trich and clue cells; neg whiff test; pH= 4.4  UA: trace lysed blood; negative for nitrites and leukocytes; SG 1.010, protein neg    Assessment:  Vulvovaginal candidiasis  (primary encounter diagnosis)  Need for vaccination  Vaginal itching  Urinary urgency      Plan:  Diflucan 150 mg by mouth once for treatment of vulvovaginal candidiasis  UA was negative (normal) today - no evidence of a UTI.   May schedule pelvic ultrasound to confirm evaluation of cyst if desired; no tenderness or mass noted on exam today and she is not experiencing pelvic pain, which is reassuring.   Flu shot administered today.  Return to clinic if symptoms do not completely resolve or return.    Arabella expressed understanding and agreement with the plan for care.  Paige Alex  JARNO, APRN, WHNP      Again, thank you for allowing me to participate in the care of your patient.      Sincerely,    GWEN Garrett CNP

## 2018-10-25 NOTE — MR AVS SNAPSHOT
After Visit Summary   10/25/2018    Arabella Vásquez    MRN: [id number]           Patient Information     Date Of Birth          1995        Visit Information        Provider Department      10/25/2018 2:00 PM Paige Alex APRN [facility]        Today's Diagnoses     Vulvovaginal candidiasis    -  1    Need for vaccination        Vaginal itching        Urinary urgency          Care Instructions    Take 1 dose of Diflucan by mouth today to treat your yeast infection. Notify provider if symptoms do not improve.    Flu vaccine was administered today              Follow-ups after your visit        Who to contact     Please call your clinic at [phone] to:    Ask questions about your health    Make or cancel appointments    Discuss your medicines    Learn about your test results    Speak to your doctor            Additional Information About Your Visit        MyChart Information     [employer] is an electronic gateway that provides easy, online access to your medical records. With [employer], you can request a clinic appointment, read your test results, renew a prescription or communicate with your care team.     To sign up for [employer] visit the website at [email or web address]   You will be asked to enter the access code listed below, as well as some personal information. Please follow the directions to create your username and password.     Your access code is: [email or web address]  Expires: 11/15/2018  6:30 AM     Your access code will  in 90 days. If you need help or a new code, please contact your [facility] or call [phone] for assistance.        Care EveryWhere ID     This is your Care EveryWhere ID. This could be used by other organizations to access your [facility] medical records  [id number]        Your Vitals Were     Pulse Last Period Breastfeeding? BMI (Body Mass Index)          79 10/10/2018 No 22.54 kg/m2          Blood Pressure from Last 3 Encounters:   10/25/18 139/85   08/31/18 118/75   07/18/17 114/56    Weight from Last 3 Encounters:   10/25/18 65.8 kg (145 lb)   08/31/18 64.9 kg (143 lb)   07/17/17 65 kg (143 lb 3.2 oz)              We Performed the Following     Clinitek Urine Macroscopic Nursing POCT     HC FLU VAC PRESRV FREE QUAD SPLIT VIR 3+YRS IM     Wet Prep POCT          Today's Medication Changes          These changes are accurate as of 10/25/18  2:49 PM.  If you have any questions, ask your nurse or doctor.               Start taking these medicines.        Dose/Directions    fluconazole 150 MG tablet   Commonly known as:  DIFLUCAN   Used for:  Vulvovaginal candidiasis   Started by:  Paige Alex APRN CNP        Dose:  150 mg   Take 1 tablet (150 mg) by mouth once for 1 dose   Quantity:  1 tablet   Refills:  0         These medicines have changed or have updated prescriptions.        Dose/Directions    desogestrel-ethinyl estradiol 0.15-30 MG-MCG per tablet   Commonly known as:  APRI   This may have changed:  Another medication with the same name was removed. Continue taking this medication, and follow the directions you see here.   Used for:  Encounter for surveillance of contraceptive pills   Changed by:  Paige Alex APRN CNP        Dose:  1 tablet   Take 1 tablet by mouth daily   Quantity:  84 tablet   Refills:  3            Where to get your medicines      These medications were sent to [address] [address]     Phone:  [phone]     fluconazole 150 MG tablet                Primary Care Provider    Clay Steel       No address on file        Equal Access to Services     [facility]: German squires Sohellen, waaxda luqadaha, qaybta kaalgalo driver. So [facility] [phone].    ATENCIÓN: Si habla español, tiene a kendall disposición servicios  hamzah de asistencia lingüística. Kurt bledsoe [phone].    We comply with applicable federal civil rights laws and [place] laws. We do not discriminate on the basis of race, color, national origin, age, disability, sex, sexual orientation, or gender identity.            Thank you!     Thank you for choosing [facility]  for your care. Our goal is always to provide you with excellent care. Hearing back from our patients is one way we can continue to improve our services. Please take a few minutes to complete the written survey that you may receive in the mail after your visit with us. Thank you!             Your Updated Medication List - Protect others around you: Learn how to safely use, store and throw away your medicines at www.disposemymeds.org.          This list is accurate as of 10/25/18  2:49 PM.  Always use your most recent med list.                   Brand Name Dispense Instructions for use Diagnosis    desogestrel-ethinyl estradiol 0.15-30 MG-MCG per tablet    APRI    84 tablet    Take 1 tablet by mouth daily    Encounter for surveillance of contraceptive pills       fluconazole 150 MG tablet    DIFLUCAN    1 tablet    Take 1 tablet (150 mg) by mouth once for 1 dose    Vulvovaginal candidiasis

## 2018-10-25 NOTE — PROGRESS NOTES
Subjective:  Arabella Vásquez is a 23 yr old female, , who has been experiencing vaginal itching and increase in discharge x 1 week. Describes discharge as clear, thin, and sticky, with no odor.  Has some vulvovaginal burning after scratching. Also experienced vaginal burning when she attempted to have intercourse Friday night.  Endorses some urinary urgency and an odor to her urine. She denies abdominal or pelvic pain, fever, urinary frequency, urinary incontinence, and CVA tenderness.      She states that she has had yeast infections in the past and has been treated at [facility] with a pill and a cream and these have immediately stopped her symptoms.  She has also used Monistat 1 day in the past when similar symptoms were experienced, with complete relief. She tried Monistat 1 day on Saturday night (5 days ago) and  she felt a little better. Monday her symptoms returned, however.     Arabella also requests an ultrasound to be ordered to follow-up on a previous ovarian cyst that was noted on an ultrasound she had (these records are not available at today's visit). Arabella had an U/S order placed at her visit on 2018, after noting left adnexal tenderness on exam, but she never scheduled this. She denies pelvic pain or pain during intercourse. Reports that the discomfort she previously had when the cyst was noted on U/S is no longer present.     Contraception: Apri COCs, has a withdrawal bleed that lasts for 4 days with moderate to light bleeding.     Sexual hx: in a monogamous relationship with a male partner     Patient's last menstrual period was 10/10/2018.    Current Outpatient Prescriptions   Medication     desogestrel-ethinyl estradiol (APRI) 0.15-30 MG-MCG per tablet     fluconazole (DIFLUCAN) 150 MG tablet     No current facility-administered medications for this visit.      Past Medical History:   Diagnosis Date     Pyelonephritis 2018     Scoliosis      Past Surgical History:   Procedure  Laterality Date     C EACH ADD TOOTH EXTRACTION      wisdom teeth extracted     Family History   Problem Relation Age of Onset     Breast Cancer Mother      x 2, ages 40 and 45. BRCA negative     Colon Cancer No family hx of      Diabetes No family hx of        Objective  /81  Pulse 94  Wt 65.8 kg (145 lb)  LMP 10/10/2018  Breastfeeding? No  BMI 22.54 kg/m2  General: Pleasant female in no apparent distress  Abdomen: soft, non-tender; no evidence of CVA tenderness  Pelvic Exam:  Vulva: No external lesions, normal hair distribution, no adenopathy  Vagina: mild erythema at introitus;  Moist, pink, white; adherent discharge present, well rugated, no lesions  Cervix: nulliparous, smooth, pink, no visible lesions  Uterus: Normal size, anteverted, non-tender, mobile  Ovaries: No mass, non-tender, mobile  Rectal exam: anus without mass    Wet prep: pos for yeast; neg for trich and clue cells; neg whiff test; pH= 4.4  UA: trace lysed blood; negative for nitrites and leukocytes; SG 1.010, protein neg    Assessment:  Vulvovaginal candidiasis  (primary encounter diagnosis)  Need for vaccination  Vaginal itching  Urinary urgency      Plan:  Diflucan 150 mg by mouth once for treatment of vulvovaginal candidiasis  UA was negative (normal) today - no evidence of a UTI.   May schedule pelvic ultrasound to confirm evaluation of cyst if desired; no tenderness or mass noted on exam today and she is not experiencing pelvic pain, which is reassuring.   Flu shot administered today.  Return to clinic if symptoms do not completely resolve or return.    Arabella expressed understanding and agreement with the plan for care.  Paige Alex, DNP, APRN, WHNP

## 2018-10-25 NOTE — LETTER
Date:November 1, 2018      Patient was self referred, no letter generated. Do not send.        [facility] Information

## 2019-02-21 ENCOUNTER — OFFICE VISIT (OUTPATIENT)
Dept: OBGYN | Facility: CLINIC | Age: 24
End: 2019-02-21
Attending: NURSE PRACTITIONER
Payer: COMMERCIAL

## 2019-02-21 ENCOUNTER — ANCILLARY PROCEDURE (OUTPATIENT)
Dept: ULTRASOUND IMAGING | Facility: CLINIC | Age: 24
End: 2019-02-21
Attending: NURSE PRACTITIONER
Payer: COMMERCIAL

## 2019-02-21 VITALS
BODY MASS INDEX: 23.13 KG/M2 | HEIGHT: 67 IN | HEART RATE: 105 BPM | DIASTOLIC BLOOD PRESSURE: 86 MMHG | SYSTOLIC BLOOD PRESSURE: 128 MMHG | WEIGHT: 147.4 LBS

## 2019-02-21 DIAGNOSIS — N94.10 DYSPAREUNIA IN FEMALE: ICD-10-CM

## 2019-02-21 DIAGNOSIS — N93.9 ABNORMAL UTERINE BLEEDING (AUB): ICD-10-CM

## 2019-02-21 DIAGNOSIS — N89.8 VAGINAL ITCHING: Primary | ICD-10-CM

## 2019-02-21 DIAGNOSIS — Z11.3 SCREEN FOR STD (SEXUALLY TRANSMITTED DISEASE): ICD-10-CM

## 2019-02-21 PROCEDURE — 87491 CHLMYD TRACH DNA AMP PROBE: CPT | Performed by: NURSE PRACTITIONER

## 2019-02-21 PROCEDURE — 87591 N.GONORRHOEAE DNA AMP PROB: CPT | Performed by: NURSE PRACTITIONER

## 2019-02-21 PROCEDURE — G0463 HOSPITAL OUTPT CLINIC VISIT: HCPCS | Mod: 25

## 2019-02-21 PROCEDURE — 76830 TRANSVAGINAL US NON-OB: CPT

## 2019-02-21 RX ORDER — FLUCONAZOLE 150 MG/1
150 TABLET ORAL DAILY
Qty: 3 TABLET | Refills: 0 | Status: SHIPPED | OUTPATIENT
Start: 2019-02-21 | End: 2019-07-26

## 2019-02-21 SDOH — HEALTH STABILITY: MENTAL HEALTH: HOW MANY STANDARD DRINKS CONTAINING ALCOHOL DO YOU HAVE ON A TYPICAL DAY?: 1 OR 2

## 2019-02-21 SDOH — HEALTH STABILITY: MENTAL HEALTH: HOW OFTEN DO YOU HAVE 6 OR MORE DRINKS ON ONE OCCASION?: WEEKLY

## 2019-02-21 ASSESSMENT — PAIN SCALES - GENERAL: PAINLEVEL: NO PAIN (0)

## 2019-02-21 ASSESSMENT — MIFFLIN-ST. JEOR: SCORE: 1465.19

## 2019-02-21 NOTE — NURSING NOTE
Chief Complaint   Patient presents with     Consult     Pt c/o painful intercourse and left side abdominal cramping. Pt states she had mid cycle vaginal bleeding.

## 2019-02-21 NOTE — PATIENT INSTRUCTIONS
STD result in MyChart  Schedule pelvic ultrasound  Diflucan as directed  Keep a log of any bleeding that occurs outside of your withdrawal bleed  We will discuss a plan when your ultrasound results are back            Patient Education     Ovarian Cysts  A cyst is usually a fluid-filled sac, like a small water balloon. Cysts are almost always harmless, and many go away on their own. Usually they grow slowly. They can vary in size from as small as a pea to larger than a grapefruit. Many cause no symptoms at all. Often they are felt only during a pelvic exam. Ovarian cysts are usually not cancer.       Functional cyst  A functional cyst is the most common kind of cyst. It forms when a follicle does not release a mature egg or continues to grow after releasing the egg. Functional cysts usually occur on only one ovary at a time. They usually shrink on their own in 1 to 3 months. In rare cases, a cyst will burst (rupture), causing pain. Pain might also be caused by the twisting of an ovary that is enlarged because of the cyst growing on it.     Dermoid cyst  Sometimes cells that are present from birth will start to grow into different kinds of tissue such as skin, fat, hair, and teeth. This kind of cyst is called a dermoid cyst. Dermoid cysts can grow on one or both ovaries. Usually they cause no symptoms. But if they leak or the ovary becomes twisted, they can cause severe pain.    Endometrioma  Sometimes tissue similar to the lining of the uterus (endometrium) grows and becomes part of the ovary. This kind of cyst is often called a chocolate cyst because of its dark-brown color. These cysts can grow on one or both ovaries. They often cause pain, especially around menstruation or during sex.    Benign cystadenoma  If the capsule that surrounds the ovary grows, it can form a cystadenoma. These cysts can grow on one or both ovaries. Usually they cause no symptoms if they are small. But if they become large, they can press  on organs near the ovaries, causing pain. They can also cause pain by stretching the ovarian capsule. A cyst that pushes on the bladder can cause frequent urination. Sometimes these cysts rupture and bleed.  Malignant cysts  These cysts can invade other tissues or spread to other parts of the body.  Date Last Reviewed: 6/1/2017 2000-2018 The "[employer] [address] [place] [address]. All rights reserved. This information is not intended as a substitute for professional medical care. Always follow your healthcare professional's instructions.

## 2019-02-21 NOTE — LETTER
2019       RE: Arabella Vásquez   [address]  [facility] [address]     Dear Colleague,    Thank you for referring your patient, Arabella Vásquez, to the [facility] at [facility]. Please see a copy of my visit note below.        Progress Note    SUBJECTIVE:  Arabella Vásquez is an 23 year old, , who presents with concerns for a yeast infection and left lower pelvic pain.  Pap , nl results    Concerns today include:     1. On  patient experienced thick, white discharge associated with dyspareunia. She treated herself with a one time dose of Monistat. Her symptoms improved slightly but did not completely resolve. Patient reports 4 yeast infections in the past year. For past yeast infections, patient has required Diflucan x2 for treatment.    2. On  patient experience brownish red spotting that lasted for 1 day requiring her to use 1 pad. Patient also reports intermittent cramping type pain on her left side, rating pain 6/10. LMP 19. Reports taking her COCs every day without missing pills. Denies post coital spotting. Has been in a monogamous relationship with a male partner for the past 2.5 years.    Works at [employer].  Uses [facility] for her pharmacy    Menstrual History:  Menstrual History 10/25/2018 2019 2019   LAST MENSTRUAL PERIOD 10/10/2018 2019 -   Menarche Age - - 13   Period Cycle (Days) - - 28   Period Duration (Days) - - 5   Method of Contraception - - Combined oral contraceptive   Period Pattern - - Regular   Menstrual Flow - - Moderate   Menstrual Control - - Tampon   Dysmenorrhea - - Mild   PMS Symptoms - - Cramping   Reviewed Today - - Yes       History of abnormal Pap smear: NO - age 21-29 PAP every 3 years recommended      HISTORY:    Current Outpatient Medications on File Prior to Visit:  desogestrel-ethinyl estradiol (APRI) 0.15-30 MG-MCG per tablet Take 1 tablet by mouth daily     No  current facility-administered medications on file prior to visit.   Allergies   Allergen Reactions     Amoxicillin Hives     Bactrim [Sulfamethoxazole W/Trimethoprim] Nausea and Vomiting     Cefzil [Cefprozil] Hives     Clindamycin Nausea and Vomiting     Immunization History   Administered Date(s) Administered     Influenza Vaccine IM 3yrs+ 4 Valent IIV4 10/25/2018       Obstetric History       T0      L0     SAB0   TAB0   Ectopic0   Multiple0   Live Births0      Past Medical History:   Diagnosis Date     Pyelonephritis 2018     Scoliosis      Past Surgical History:   Procedure Laterality Date     C EACH ADD TOOTH EXTRACTION      wisdom teeth extracted     Family History   Problem Relation Age of Onset     Breast Cancer Mother         x 2, ages 40 and 45. BRCA negative     Colon Cancer No family hx of      Diabetes No family hx of      Social History     Socioeconomic History     Marital status: Single     Spouse name: None     Number of children: None     Years of education: None     Highest education level: None   Occupational History     None   Social Needs     Financial resource strain: None     Food insecurity:     Worry: None     Inability: None     Transportation needs:     Medical: None     Non-medical: None   Tobacco Use     Smoking status: Never Smoker     Smokeless tobacco: Never Used   Substance and Sexual Activity     Alcohol use: Yes     Alcohol/week: 0.6 oz     Types: 1 Glasses of wine per week     Drinks per session: 1 or 2     Binge frequency: Weekly     Comment: social     Drug use: No     Sexual activity: Yes     Partners: Male     Birth control/protection: Pill   Lifestyle     Physical activity:     Days per week: None     Minutes per session: None     Stress: None   Relationships     Social connections:     Talks on phone: None     Gets together: None     Attends Mandaeism service: None     Active member of club or organization: None     Attends meetings of clubs or  "organizations: None     Relationship status: None     Intimate partner violence:     Fear of current or ex partner: None     Emotionally abused: None     Physically abused: None     Forced sexual activity: None   Other Topics Concern     None   Social History Narrative    How much exercise per week? 4-5 x's    How much calcium per day? In foods       How much caffeine per day? 1-2 cups     How much vitamin D per day? In foods    Do you/your family wear seatbelts?  Yes    Do you/your family use safety helmets? n/a    Do you/your family use sunscreen? Yes    Do you/your family keep firearms in the home? No    Do you/your family have a smoke detector(s)? Yes        February 21, 2019 Sha Chamberlain [facility]       EXAM:  Blood pressure 128/86, pulse 105, height 1.708 m (5' 7.25\"), weight 66.9 kg (147 lb 6.4 oz), last menstrual period 01/30/2019, not currently breastfeeding. Body mass index is 22.91 kg/m .  General - pleasant female in no acute distress.  Skin - no suspicious lesions or rashes  Abdomen - soft, nontender, nondistended, no masses or organomegaly noted.  Musculoskeletal - no gross deformities.  Neurological - normal strength, sensation, and mental status.    Pelvic Exam:  EG/BUS: Normal genital architecture without lesions, erythema or abnormal secretions. Normal genital architecture without lesions, erythema or abnormal secretions Bartholin's, Urethra, Bieber's normal   Urethral meatus: normal   Urethra: no masses, tenderness, or scarring  Bladder: no masses or tenderness   Vagina: moist, pink, rugae with creamy, white and odorless  secretions  Cervix: no lesions, large ectropion noted  Uterus: Anteverted  Adnexa: Left adnexal tenderness with palpation and on bimanual exam. No masses or nodularity noted.  Rectum:anus normal     Wet Prep at POCT:  Neg amines  pH 4.5  Neg clue cells  Neg yeast    ASSESSMENT:  Encounter Diagnoses   Name Primary?     Vaginal itching Yes     Screen for STD (sexually transmitted disease) " "     Dyspareunia in female      Abnormal uterine bleeding (AUB)         PLAN:     Orders Placed This Encounter   Medications     fluconazole (DIFLUCAN) 150 MG tablet     Sig: Take 1 tablet (150 mg) by mouth daily for 3 days     Dispense:  3 tablet     Refill:  0     Await results of CT/GC screening  Schedule pelvic US  Diflucan as directed  Keep a log of any bleeding that occurs outside of your withdrawal bleed  We will discuss a plan when your ultrasound results are back      I,  LUIS ANTONIO Melton, RN, JARON, MARIVEL Student, completed the PFSH and ROS. I then acted as a scribe for Kitty ORTA for the remainder of the visit.  MARIVEL Student. LUIS ANTONIO Melton, RN, JARON ORTA Student    \"I agree with the PFSH and ROS as completed by the NP Student, LUIS ANTONIO Melton, RN except for changes made by me. The remainder of the encounter was performed by me and scribed by the Student. The scribed note accurately reflects my personal services and decisions made by me.    Kitty ORTA      "

## 2019-02-21 NOTE — PROGRESS NOTES
Progress Note    SUBJECTIVE:  Arabella Vásquez is an 23 year old, , who presents with concerns for a yeast infection and left lower pelvic pain.  Pap , nl results    Concerns today include:     1. On  patient experienced thick, white discharge associated with dyspareunia. She treated herself with a one time dose of Monistat. Her symptoms improved slightly but did not completely resolve. Patient reports 4 yeast infections in the past year. For past yeast infections, patient has required Diflucan x2 for treatment.    2. On  patient experience brownish red spotting that lasted for 1 day requiring her to use 1 pad. Patient also reports intermittent cramping type pain on her left side, rating pain 6/10. LMP 19. Reports taking her COCs every day without missing pills. Denies post coital spotting. Has been in a monogamous relationship with a male partner for the past 2.5 years.    Works at [employer].  Uses [facility] for her pharmacy    Menstrual History:  Menstrual History 10/25/2018 2019 2019   LAST MENSTRUAL PERIOD 10/10/2018 2019 -   Menarche Age - - 13   Period Cycle (Days) - - 28   Period Duration (Days) - - 5   Method of Contraception - - Combined oral contraceptive   Period Pattern - - Regular   Menstrual Flow - - Moderate   Menstrual Control - - Tampon   Dysmenorrhea - - Mild   PMS Symptoms - - Cramping   Reviewed Today - - Yes       History of abnormal Pap smear: NO - age 21-29 PAP every 3 years recommended      HISTORY:    Current Outpatient Medications on File Prior to Visit:  desogestrel-ethinyl estradiol (APRI) 0.15-30 MG-MCG per tablet Take 1 tablet by mouth daily     No current facility-administered medications on file prior to visit.   Allergies   Allergen Reactions     Amoxicillin Hives     Bactrim [Sulfamethoxazole W/Trimethoprim] Nausea and Vomiting     Cefzil [Cefprozil] Hives     Clindamycin Nausea and Vomiting     Immunization History   Administered Date(s)  Administered     Influenza Vaccine IM 3yrs+ 4 Valent IIV4 10/25/2018       Obstetric History       T0      L0     SAB0   TAB0   Ectopic0   Multiple0   Live Births0      Past Medical History:   Diagnosis Date     Pyelonephritis 2018     Scoliosis      Past Surgical History:   Procedure Laterality Date     C EACH ADD TOOTH EXTRACTION      wisdom teeth extracted     Family History   Problem Relation Age of Onset     Breast Cancer Mother         x 2, ages 40 and 45. BRCA negative     Colon Cancer No family hx of      Diabetes No family hx of      Social History     Socioeconomic History     Marital status: Single     Spouse name: None     Number of children: None     Years of education: None     Highest education level: None   Occupational History     None   Social Needs     Financial resource strain: None     Food insecurity:     Worry: None     Inability: None     Transportation needs:     Medical: None     Non-medical: None   Tobacco Use     Smoking status: Never Smoker     Smokeless tobacco: Never Used   Substance and Sexual Activity     Alcohol use: Yes     Alcohol/week: 0.6 oz     Types: 1 Glasses of wine per week     Drinks per session: 1 or 2     Binge frequency: Weekly     Comment: social     Drug use: No     Sexual activity: Yes     Partners: Male     Birth control/protection: Pill   Lifestyle     Physical activity:     Days per week: None     Minutes per session: None     Stress: None   Relationships     Social connections:     Talks on phone: None     Gets together: None     Attends Moravian service: None     Active member of club or organization: None     Attends meetings of clubs or organizations: None     Relationship status: None     Intimate partner violence:     Fear of current or ex partner: None     Emotionally abused: None     Physically abused: None     Forced sexual activity: None   Other Topics Concern     None   Social History Narrative    How much exercise per week? 4-5 x's     "How much calcium per day? In foods       How much caffeine per day? 1-2 cups     How much vitamin D per day? In foods    Do you/your family wear seatbelts?  Yes    Do you/your family use safety helmets? n/a    Do you/your family use sunscreen? Yes    Do you/your family keep firearms in the home? No    Do you/your family have a smoke detector(s)? Yes        February 21, 2019 Simniyah Chamberlain [facility]       EXAM:  Blood pressure 128/86, pulse 105, height 1.708 m (5' 7.25\"), weight 66.9 kg (147 lb 6.4 oz), last menstrual period 01/30/2019, not currently breastfeeding. Body mass index is 22.91 kg/m .  General - pleasant female in no acute distress.  Skin - no suspicious lesions or rashes  Abdomen - soft, nontender, nondistended, no masses or organomegaly noted.  Musculoskeletal - no gross deformities.  Neurological - normal strength, sensation, and mental status.    Pelvic Exam:  EG/BUS: Normal genital architecture without lesions, erythema or abnormal secretions. Normal genital architecture without lesions, erythema or abnormal secretions Bartholin's, Urethra, [place]'s normal   Urethral meatus: normal   Urethra: no masses, tenderness, or scarring  Bladder: no masses or tenderness   Vagina: moist, pink, rugae with creamy, white and odorless  secretions  Cervix: no lesions, large ectropion noted  Uterus: Anteverted  Adnexa: Left adnexal tenderness with palpation and on bimanual exam. No masses or nodularity noted.  Rectum:anus normal     Wet Prep at POCT:  Neg amines  pH 4.5  Neg clue cells  Neg yeast    ASSESSMENT:  Encounter Diagnoses   Name Primary?     Vaginal itching Yes     Screen for STD (sexually transmitted disease)      Dyspareunia in female      Abnormal uterine bleeding (AUB)         PLAN:     Orders Placed This Encounter   Medications     fluconazole (DIFLUCAN) 150 MG tablet     Sig: Take 1 tablet (150 mg) by mouth daily for 3 days     Dispense:  3 tablet     Refill:  0     Await results of CT/GC screening  Schedule " "pelvic US  Diflucan as directed  Keep a log of any bleeding that occurs outside of your withdrawal bleed  We will discuss a plan when your ultrasound results are back      I,  LUIS ANTONIO Melton, RN, JARON, MARIVEL Student, completed the PFSH and ROS. I then acted as a scribe for Kitty ORTA for the remainder of the visit.  MARIVEL Student. LUIS ANTONIO Melton, RN, JARON ORTA Student    \"I agree with the PFSH and ROS as completed by the SOCORRO Student, LUIS ANTONIO Melton, RN except for changes made by me. The remainder of the encounter was performed by me and scribed by the Student. The scribed note accurately reflects my personal services and decisions made by me.    Kitty ORTA  "

## 2019-02-22 LAB
C TRACH DNA SPEC QL NAA+PROBE: NEGATIVE
N GONORRHOEA DNA SPEC QL NAA+PROBE: NEGATIVE
SPECIMEN SOURCE: NORMAL
SPECIMEN SOURCE: NORMAL

## 2019-02-28 NOTE — RESULT ENCOUNTER NOTE
Dear Arabella,    The results from your transvaginal ultrasound was normal and your STD testing was negative, this is very reassuring! Given that you are having symptoms and your testing to date is normal, I suggest you keep a log of any abnormal bleeding and pain and return to clinic for further evaluation if needed. If pain continues, I recommend that you see Dr. Ann York or Dr. Jud Plascencia here at [facility].     Sincerely,  Kitty ORTA

## 2019-03-09 ENCOUNTER — HEALTH MAINTENANCE LETTER (OUTPATIENT)
Age: 24
End: 2019-03-09

## 2019-07-26 ENCOUNTER — OFFICE VISIT (OUTPATIENT)
Dept: OBGYN | Facility: CLINIC | Age: 24
End: 2019-07-26
Attending: OBSTETRICS & GYNECOLOGY
Payer: COMMERCIAL

## 2019-07-26 VITALS
HEART RATE: 106 BPM | SYSTOLIC BLOOD PRESSURE: 114 MMHG | BODY MASS INDEX: 24.31 KG/M2 | WEIGHT: 154.9 LBS | DIASTOLIC BLOOD PRESSURE: 75 MMHG | HEIGHT: 67 IN

## 2019-07-26 DIAGNOSIS — Z01.419 ENCOUNTER FOR WELL WOMAN EXAM WITH ROUTINE GYNECOLOGICAL EXAM: Primary | ICD-10-CM

## 2019-07-26 DIAGNOSIS — Z30.41 ENCOUNTER FOR SURVEILLANCE OF CONTRACEPTIVE PILLS: ICD-10-CM

## 2019-07-26 PROBLEM — N89.8 VAGINAL ITCHING: Status: RESOLVED | Noted: 2019-02-21 | Resolved: 2019-07-26

## 2019-07-26 PROBLEM — N94.10 DYSPAREUNIA IN FEMALE: Status: RESOLVED | Noted: 2019-02-21 | Resolved: 2019-07-26

## 2019-07-26 PROCEDURE — G0463 HOSPITAL OUTPT CLINIC VISIT: HCPCS | Mod: ZF

## 2019-07-26 RX ORDER — DESOGESTREL AND ETHINYL ESTRADIOL 0.15-0.03
1 KIT ORAL DAILY
Qty: 84 TABLET | Refills: 3 | Status: SHIPPED | OUTPATIENT
Start: 2019-07-26

## 2019-07-26 ASSESSMENT — ANXIETY QUESTIONNAIRES
IF YOU CHECKED OFF ANY PROBLEMS ON THIS QUESTIONNAIRE, HOW DIFFICULT HAVE THESE PROBLEMS MADE IT FOR YOU TO DO YOUR WORK, TAKE CARE OF THINGS AT HOME, OR GET ALONG WITH OTHER PEOPLE: NOT DIFFICULT AT ALL
GAD7 TOTAL SCORE: 0
5. BEING SO RESTLESS THAT IT IS HARD TO SIT STILL: NOT AT ALL
6. BECOMING EASILY ANNOYED OR IRRITABLE: NOT AT ALL
7. FEELING AFRAID AS IF SOMETHING AWFUL MIGHT HAPPEN: NOT AT ALL
2. NOT BEING ABLE TO STOP OR CONTROL WORRYING: NOT AT ALL
3. WORRYING TOO MUCH ABOUT DIFFERENT THINGS: NOT AT ALL
1. FEELING NERVOUS, ANXIOUS, OR ON EDGE: NOT AT ALL

## 2019-07-26 ASSESSMENT — PATIENT HEALTH QUESTIONNAIRE - PHQ9
SUM OF ALL RESPONSES TO PHQ QUESTIONS 1-9: 0
5. POOR APPETITE OR OVEREATING: NOT AT ALL

## 2019-07-26 ASSESSMENT — MIFFLIN-ST. JEOR: SCORE: 1490.25

## 2019-07-26 NOTE — LETTER
2019       RE: Arabella Vásquez   [address]  [facility] [address]     Dear Colleague,    Thank you for referring your patient, Arabella Vásquez, to the [facility] at [facility]. Please see a copy of my visit note below.    [facility]  Gynecology Visit    Reason for Consult: Annual exam   Consulting Provider: Self    HPI:    Arabella Vásquez is a 24 year old , here for her annual exam. She is feeling well, she has no complaints today. She reports she works in [employer], is active and enjoys [employer] in her free time. She has been in a relationship for nearly three years, and it is a healthy and safe relationship; denies abuse concerns or STI concerns or history. She reports she has had one normal pap when she turned 22, it was done at her former PCP in [place]. Per chart review, she has had all three of her Gardasil vaccinations. She takes her birth control daily and it has shortened duration to 4-5 days and her periods are lighter. She would like a refill today. H    Her family history is significant for mother with breast cancer in her 40's and recurrence in her 50's, mom tested negative for BRCA mutations. She does not do regular self breast exams, but patient was reccommended to start mammography at age 31.     GYN History  - LMP: Patient's last menstrual period was 2019 (exact date).  - Menses: regular, 28-30day cycles   - Pap Smears: First one at age 22, per patient it was normal   - Contraception: OCPs - desogestrel-ethinyl estradiol (APRI) 0.15-30 MG-MCG tablet  - Sexual Activity/Concerns:  None  - Hx STIs/UTIs: None    OBHx  OB History    Para Term  AB Living   0 0 0 0 0 0   SAB TAB Ectopic Multiple Live Births   0 0 0 0 0       PMHx:   - Pelvic pain, resolved with treatment of yeast infection w fluconazole   - Pyelonephritis   - Scoliosis     PSHx:   Past Surgical History:   Procedure Laterality Date     C  "EACH ADD TOOTH EXTRACTION      wisdom teeth extracted       Meds:   Current Outpatient Medications   Medication     desogestrel-ethinyl estradiol (APRI) 0.15-30 MG-MCG tablet     No current facility-administered medications for this visit.        Allergies:       Allergies   Allergen Reactions     Amoxicillin Hives     Bactrim [Sulfamethoxazole W/Trimethoprim] Nausea and Vomiting     Cefzil [Cefprozil] Hives     Clindamycin Nausea and Vomiting       SocHx:   - Lives alone  - Works in [place]   - Never smoker  - Denies ilicit drug use  - Drinkes 1-2 glasses of wine  / week    FamHx:    - Mother with breast cancer, BRCA negative    ROS: 10-Point ROS negative except as noted in HPI    Physical Exam  /75 (BP Location: Left arm, Patient Position: Chair)   Pulse 106   Ht 1.702 m (5' 7\")   Wt 70.3 kg (154 lb 14.4 oz)   LMP 2019 (Exact Date)   BMI 24.26 kg/m     Gen: Well-appearing, NAD  HEENT: Normocephalic, atraumatic  Neck: Thyroid is not enlarged, no appreciable masses palpated. Non-tender  CV:  RRR, no m/r/g auscultated  Pulm: CTAB, no w/r/r auscultated  Abd: Soft, non-tender, non-distended  Ext: No LE edema, extremities warm and well perfused    Breast: Symmetric, no nipple discharge or retraction, no axillary lymphadenopathy, no palpable nodules or masses bilaterally    Pelvic:  Normal appearing external female genitalia. Normal hair distribution. Vagina is without lesions. No discharge. Cervix nulliparous, no lesions, no cervical motion tenderness. Large ectropion present.  Uterus is small, mobile, non-tender. No adnexal tenderness or masses      Assessment/Plan:  Arabella Vásquez is a 24 year old  female here for her annual wellness visit   - Continue with current birth control, Rx refilled today   - Advised regular self-breast exams     Return to clinic next year for annual exam or sooner with concerns     Staffed with .     Germania Carvalho MD  Obstetrics and Gyncology, PGY-1  , " 2019 , 8:25 AM      I have seen and examined the patient with the resident. I have reviewed, edited, and agree with the note.   My findings are:as above.  Reji Echavarria M.D.

## 2019-07-26 NOTE — PROGRESS NOTES
[facility]  Gynecology Visit    Reason for Consult: Annual exam   Consulting Provider: Self    HPI:    Arabella Vásquez is a 24 year old , here for her annual exam. She is feeling well, she has no complaints today. She reports she works in [place], is active and enjoys kickboxing in her free time. She has been in a relationship for nearly three years, and it is a healthy and safe relationship; denies abuse concerns or STI concerns or history. She reports she has had one normal pap when she turned 22, it was done at her former PCP in [place]. Per chart review, she has had all three of her Gardasil vaccinations. She takes her birth control daily and it has shortened duration to 4-5 days and her periods are lighter. She would like a refill today. H    Her family history is significant for mother with breast cancer in her 40's and recurrence in her 50's, mom tested negative for BRCA mutations. She does not do regular self breast exams, but patient was reccommended to start mammography at age 31.     GYN History  - LMP: Patient's last menstrual period was 2019 (exact date).  - Menses: regular, 28-30day cycles   - Pap Smears: First one at age 22, per patient it was normal   - Contraception: OCPs - desogestrel-ethinyl estradiol (APRI) 0.15-30 MG-MCG tablet  - Sexual Activity/Concerns:  None  - Hx STIs/UTIs: None    OBHx  OB History    Para Term  AB Living   0 0 0 0 0 0   SAB TAB Ectopic Multiple Live Births   0 0 0 0 0       PMHx:   - Pelvic pain, resolved with treatment of yeast infection w fluconazole   - Pyelonephritis   - Scoliosis     PSHx:   Past Surgical History:   Procedure Laterality Date     C EACH ADD TOOTH EXTRACTION      wisdom teeth extracted       Meds:   Current Outpatient Medications   Medication     desogestrel-ethinyl estradiol (APRI) 0.15-30 MG-MCG tablet     No current facility-administered medications for this visit.        Allergies:       Allergies   Allergen Reactions      "Amoxicillin Hives     Bactrim [Sulfamethoxazole W/Trimethoprim] Nausea and Vomiting     Cefzil [Cefprozil] Hives     Clindamycin Nausea and Vomiting       SocHx:   - Lives alone  - Works in [place]   - Never smoker  - Denies ilicit drug use  - Drinkes 1-2 glasses of wine  / week    FamHx:    - Mother with breast cancer, BRCA negative    ROS: 10-Point ROS negative except as noted in HPI    Physical Exam  /75 (BP Location: Left arm, Patient Position: Chair)   Pulse 106   Ht 1.702 m (5' 7\")   Wt 70.3 kg (154 lb 14.4 oz)   LMP 2019 (Exact Date)   BMI 24.26 kg/m    Gen: Well-appearing, NAD  HEENT: Normocephalic, atraumatic  Neck: Thyroid is not enlarged, no appreciable masses palpated. Non-tender  CV:  RRR, no m/r/g auscultated  Pulm: CTAB, no w/r/r auscultated  Abd: Soft, non-tender, non-distended  Ext: No LE edema, extremities warm and well perfused    Breast: Symmetric, no nipple discharge or retraction, no axillary lymphadenopathy, no palpable nodules or masses bilaterally    Pelvic:  Normal appearing external female genitalia. Normal hair distribution. Vagina is without lesions. No discharge. Cervix nulliparous, no lesions, no cervical motion tenderness. Large ectropion present.  Uterus is small, mobile, non-tender. No adnexal tenderness or masses      Assessment/Plan:  Arabella Vásquez is a 24 year old  female here for her annual wellness visit   - Continue with current birth control, Rx refilled today   - Advised regular self-breast exams     Return to clinic next year for annual exam or sooner with concerns     Staffed with .     Germania Carvalho MD  Obstetrics and Gyncology, PGY-1  2019 , 8:25 AM      I have seen and examined the patient with the resident. I have reviewed, edited, and agree with the note.   My findings are:as above.  Reji Echavarria M.D."

## 2019-07-27 ASSESSMENT — ANXIETY QUESTIONNAIRES: GAD7 TOTAL SCORE: 0

## 2020-03-11 ENCOUNTER — HEALTH MAINTENANCE LETTER (OUTPATIENT)
Age: 25
End: 2020-03-11

## 2020-12-27 ENCOUNTER — HEALTH MAINTENANCE LETTER (OUTPATIENT)
Age: 25
End: 2020-12-27

## 2021-10-09 ENCOUNTER — HEALTH MAINTENANCE LETTER (OUTPATIENT)
Age: 26
End: 2021-10-09

## 2022-01-29 ENCOUNTER — HEALTH MAINTENANCE LETTER (OUTPATIENT)
Age: 27
End: 2022-01-29

## 2022-09-17 ENCOUNTER — HEALTH MAINTENANCE LETTER (OUTPATIENT)
Age: 27
End: 2022-09-17

## 2023-05-06 ENCOUNTER — HEALTH MAINTENANCE LETTER (OUTPATIENT)
Age: 28
End: 2023-05-06